# Patient Record
Sex: FEMALE | Race: WHITE | HISPANIC OR LATINO | Employment: OTHER | URBAN - METROPOLITAN AREA
[De-identification: names, ages, dates, MRNs, and addresses within clinical notes are randomized per-mention and may not be internally consistent; named-entity substitution may affect disease eponyms.]

---

## 2018-01-19 ENCOUNTER — HOSPITAL ENCOUNTER (INPATIENT)
Facility: HOSPITAL | Age: 68
LOS: 3 days | Discharge: HOME | DRG: 369 | End: 2018-01-23
Attending: EMERGENCY MEDICINE | Admitting: INTERNAL MEDICINE
Payer: MEDICARE

## 2018-01-19 DIAGNOSIS — R19.5 HEME POSITIVE STOOL: ICD-10-CM

## 2018-01-19 DIAGNOSIS — D61.818 PANCYTOPENIA: Primary | ICD-10-CM

## 2018-01-19 DIAGNOSIS — K92.2 GI BLEED: ICD-10-CM

## 2018-01-19 DIAGNOSIS — E11.9 DM (DIABETES MELLITUS): ICD-10-CM

## 2018-01-19 PROCEDURE — 93010 ELECTROCARDIOGRAM REPORT: CPT | Mod: ,,, | Performed by: INTERNAL MEDICINE

## 2018-01-19 PROCEDURE — 99285 EMERGENCY DEPT VISIT HI MDM: CPT | Mod: 25

## 2018-01-19 PROCEDURE — 93005 ELECTROCARDIOGRAM TRACING: CPT

## 2018-01-20 PROBLEM — E11.9 DM (DIABETES MELLITUS): Status: ACTIVE | Noted: 2018-01-20

## 2018-01-20 PROBLEM — R93.89 CHEST X-RAY ABNORMALITY: Status: ACTIVE | Noted: 2018-01-20

## 2018-01-20 PROBLEM — D61.818 PANCYTOPENIA: Status: ACTIVE | Noted: 2018-01-20

## 2018-01-20 PROBLEM — K92.2 GI BLEED: Status: ACTIVE | Noted: 2018-01-20

## 2018-01-20 PROBLEM — I10 HYPERTENSION: Status: ACTIVE | Noted: 2018-01-20

## 2018-01-20 PROBLEM — R93.89 CHEST X-RAY ABNORMALITY: Status: RESOLVED | Noted: 2018-01-20 | Resolved: 2018-01-20

## 2018-01-20 PROBLEM — D62 ACUTE BLOOD LOSS ANEMIA: Status: ACTIVE | Noted: 2018-01-20

## 2018-01-20 LAB
ABO + RH BLD: NORMAL
ALBUMIN SERPL BCP-MCNC: 3.5 G/DL
ALP SERPL-CCNC: 64 U/L
ALT SERPL W/O P-5'-P-CCNC: 10 U/L
AMPHET+METHAMPHET UR QL: NEGATIVE
ANION GAP SERPL CALC-SCNC: 8 MMOL/L
ANION GAP SERPL CALC-SCNC: 8 MMOL/L
ANION GAP SERPL CALC-SCNC: 9 MMOL/L
ANISOCYTOSIS BLD QL SMEAR: ABNORMAL
APTT BLDCRRT: 21.2 SEC
APTT BLDCRRT: 21.9 SEC
AST SERPL-CCNC: 29 U/L
BARBITURATES UR QL SCN>200 NG/ML: NEGATIVE
BASOPHILS # BLD AUTO: 0.01 K/UL
BASOPHILS NFR BLD: 0.4 %
BASOPHILS NFR BLD: 0.5 %
BASOPHILS NFR BLD: 0.5 %
BENZODIAZ UR QL SCN>200 NG/ML: NEGATIVE
BILIRUB SERPL-MCNC: 2 MG/DL
BILIRUB UR QL STRIP: NEGATIVE
BLD GP AB SCN CELLS X3 SERPL QL: NORMAL
BLD PROD TYP BPU: NORMAL
BLOOD UNIT EXPIRATION DATE: NORMAL
BLOOD UNIT TYPE CODE: 5100
BLOOD UNIT TYPE: NORMAL
BUN SERPL-MCNC: 15 MG/DL
BUN SERPL-MCNC: 15 MG/DL
BUN SERPL-MCNC: 17 MG/DL
BZE UR QL SCN: NEGATIVE
CALCIUM SERPL-MCNC: 8.6 MG/DL
CANNABINOIDS UR QL SCN: NEGATIVE
CHLORIDE SERPL-SCNC: 110 MMOL/L
CLARITY UR: CLEAR
CO2 SERPL-SCNC: 19 MMOL/L
CO2 SERPL-SCNC: 20 MMOL/L
CO2 SERPL-SCNC: 20 MMOL/L
CODING SYSTEM: NORMAL
COLOR UR: YELLOW
CREAT SERPL-MCNC: 0.9 MG/DL
CREAT SERPL-MCNC: 0.9 MG/DL
CREAT SERPL-MCNC: 1 MG/DL
CREAT UR-MCNC: 34 MG/DL
DACRYOCYTES BLD QL SMEAR: ABNORMAL
DAT IGG-SP REAG RBC-IMP: NORMAL
DIFFERENTIAL METHOD: ABNORMAL
DISPENSE STATUS: NORMAL
EOSINOPHIL # BLD AUTO: 0 K/UL
EOSINOPHIL NFR BLD: 0 %
ERYTHROCYTE [DISTWIDTH] IN BLOOD BY AUTOMATED COUNT: 31.9 %
ERYTHROCYTE [DISTWIDTH] IN BLOOD BY AUTOMATED COUNT: 32.7 %
ERYTHROCYTE [DISTWIDTH] IN BLOOD BY AUTOMATED COUNT: 32.7 %
EST. GFR  (AFRICAN AMERICAN): >60 ML/MIN/1.73 M^2
EST. GFR  (NON AFRICAN AMERICAN): 58 ML/MIN/1.73 M^2
EST. GFR  (NON AFRICAN AMERICAN): >60 ML/MIN/1.73 M^2
EST. GFR  (NON AFRICAN AMERICAN): >60 ML/MIN/1.73 M^2
ESTIMATED AVG GLUCOSE: 105 MG/DL
ETHANOL UR-MCNC: <10 MG/DL
FERRITIN SERPL-MCNC: 130 NG/ML
FOLATE SERPL-MCNC: 16 NG/ML
GLUCOSE SERPL-MCNC: 117 MG/DL
GLUCOSE SERPL-MCNC: 117 MG/DL
GLUCOSE SERPL-MCNC: 126 MG/DL
GLUCOSE UR QL STRIP: NEGATIVE
HAPTOGLOB SERPL-MCNC: 30 MG/DL
HBA1C MFR BLD HPLC: 5.3 %
HCT VFR BLD AUTO: 19.9 %
HCT VFR BLD AUTO: 20.8 %
HCT VFR BLD AUTO: 22 %
HCT VFR BLD AUTO: 25.3 %
HGB BLD-MCNC: 6.9 G/DL
HGB BLD-MCNC: 7.1 G/DL
HGB BLD-MCNC: 7.6 G/DL
HGB BLD-MCNC: 8.6 G/DL
HGB UR QL STRIP: NEGATIVE
HIV1+2 IGG SERPL QL IA.RAPID: NEGATIVE
INR PPP: 1
INR PPP: 1
IRON SERPL-MCNC: 80 UG/DL
KETONES UR QL STRIP: NEGATIVE
LDH SERPL L TO P-CCNC: 707 U/L
LEUKOCYTE ESTERASE UR QL STRIP: NEGATIVE
LYMPHOCYTES # BLD AUTO: 0.9 K/UL
LYMPHOCYTES # BLD AUTO: 1 K/UL
LYMPHOCYTES # BLD AUTO: 1 K/UL
LYMPHOCYTES NFR BLD: 40.1 %
LYMPHOCYTES NFR BLD: 45.7 %
LYMPHOCYTES NFR BLD: 45.7 %
MCH RBC QN AUTO: 33.6 PG
MCH RBC QN AUTO: 33.6 PG
MCH RBC QN AUTO: 34.1 PG
MCHC RBC AUTO-ENTMCNC: 34.1 G/DL
MCHC RBC AUTO-ENTMCNC: 34.1 G/DL
MCHC RBC AUTO-ENTMCNC: 34.5 G/DL
MCV RBC AUTO: 99 FL
METHADONE UR QL SCN>300 NG/ML: NEGATIVE
MONOCYTES # BLD AUTO: 0.1 K/UL
MONOCYTES # BLD AUTO: 0.1 K/UL
MONOCYTES # BLD AUTO: 0.2 K/UL
MONOCYTES NFR BLD: 6.3 %
MONOCYTES NFR BLD: 6.3 %
MONOCYTES NFR BLD: 7.8 %
NEUTROPHILS # BLD AUTO: 1.1 K/UL
NEUTROPHILS # BLD AUTO: 1.1 K/UL
NEUTROPHILS # BLD AUTO: 1.2 K/UL
NEUTROPHILS NFR BLD: 52 %
NEUTROPHILS NFR BLD: 52 %
NEUTROPHILS NFR BLD: 56 %
NITRITE UR QL STRIP: NEGATIVE
NUM UNITS TRANS PACKED RBC: NORMAL
OPIATES UR QL SCN: NEGATIVE
OVALOCYTES BLD QL SMEAR: ABNORMAL
PCP UR QL SCN>25 NG/ML: NEGATIVE
PH UR STRIP: 5 [PH] (ref 5–8)
PLATELET # BLD AUTO: 114 K/UL
PLATELET # BLD AUTO: 116 K/UL
PLATELET # BLD AUTO: 116 K/UL
PLATELET BLD QL SMEAR: ABNORMAL
PMV BLD AUTO: 9.5 FL
PMV BLD AUTO: 9.7 FL
PMV BLD AUTO: 9.7 FL
POCT GLUCOSE: 120 MG/DL (ref 70–110)
POCT GLUCOSE: 130 MG/DL (ref 70–110)
POCT GLUCOSE: 133 MG/DL (ref 70–110)
POCT GLUCOSE: 135 MG/DL (ref 70–110)
POIKILOCYTOSIS BLD QL SMEAR: SLIGHT
POLYCHROMASIA BLD QL SMEAR: ABNORMAL
POTASSIUM SERPL-SCNC: 4.3 MMOL/L
POTASSIUM SERPL-SCNC: 4.3 MMOL/L
POTASSIUM SERPL-SCNC: 4.4 MMOL/L
PROT SERPL-MCNC: 7.3 G/DL
PROT UR QL STRIP: NEGATIVE
PROTHROMBIN TIME: 10.7 SEC
PROTHROMBIN TIME: 10.7 SEC
RBC # BLD AUTO: 2.11 M/UL
RBC # BLD AUTO: 2.11 M/UL
RBC # BLD AUTO: 2.23 M/UL
RETICS/RBC NFR AUTO: 5.5 %
SODIUM SERPL-SCNC: 138 MMOL/L
SP GR UR STRIP: 1.01 (ref 1–1.03)
SPHEROCYTES BLD QL SMEAR: ABNORMAL
TOXICOLOGY INFORMATION: NORMAL
URN SPEC COLLECT METH UR: NORMAL
UROBILINOGEN UR STRIP-ACNC: NEGATIVE EU/DL
VIT B12 SERPL-MCNC: <146 PG/ML
WBC # BLD AUTO: 2.21 K/UL
WBC # BLD AUTO: 2.21 K/UL
WBC # BLD AUTO: 2.32 K/UL

## 2018-01-20 PROCEDURE — 82746 ASSAY OF FOLIC ACID SERUM: CPT

## 2018-01-20 PROCEDURE — 21400001 HC TELEMETRY ROOM

## 2018-01-20 PROCEDURE — 85025 COMPLETE CBC W/AUTO DIFF WBC: CPT

## 2018-01-20 PROCEDURE — 86880 COOMBS TEST DIRECT: CPT

## 2018-01-20 PROCEDURE — 99223 1ST HOSP IP/OBS HIGH 75: CPT | Mod: ,,, | Performed by: INTERNAL MEDICINE

## 2018-01-20 PROCEDURE — 83615 LACTATE (LD) (LDH) ENZYME: CPT

## 2018-01-20 PROCEDURE — 85730 THROMBOPLASTIN TIME PARTIAL: CPT | Mod: 91

## 2018-01-20 PROCEDURE — 86920 COMPATIBILITY TEST SPIN: CPT

## 2018-01-20 PROCEDURE — 85610 PROTHROMBIN TIME: CPT | Mod: 91

## 2018-01-20 PROCEDURE — 94799 UNLISTED PULMONARY SVC/PX: CPT

## 2018-01-20 PROCEDURE — 85730 THROMBOPLASTIN TIME PARTIAL: CPT

## 2018-01-20 PROCEDURE — 25000003 PHARM REV CODE 250: Performed by: INTERNAL MEDICINE

## 2018-01-20 PROCEDURE — 83036 HEMOGLOBIN GLYCOSYLATED A1C: CPT

## 2018-01-20 PROCEDURE — 87086 URINE CULTURE/COLONY COUNT: CPT

## 2018-01-20 PROCEDURE — 94640 AIRWAY INHALATION TREATMENT: CPT

## 2018-01-20 PROCEDURE — 25000003 PHARM REV CODE 250: Performed by: NURSE PRACTITIONER

## 2018-01-20 PROCEDURE — C9113 INJ PANTOPRAZOLE SODIUM, VIA: HCPCS | Performed by: EMERGENCY MEDICINE

## 2018-01-20 PROCEDURE — 81003 URINALYSIS AUTO W/O SCOPE: CPT

## 2018-01-20 PROCEDURE — 85014 HEMATOCRIT: CPT | Mod: 91

## 2018-01-20 PROCEDURE — 80053 COMPREHEN METABOLIC PANEL: CPT

## 2018-01-20 PROCEDURE — 80048 BASIC METABOLIC PNL TOTAL CA: CPT

## 2018-01-20 PROCEDURE — 85610 PROTHROMBIN TIME: CPT

## 2018-01-20 PROCEDURE — 25000242 PHARM REV CODE 250 ALT 637 W/ HCPCS: Performed by: NURSE PRACTITIONER

## 2018-01-20 PROCEDURE — 36430 TRANSFUSION BLD/BLD COMPNT: CPT

## 2018-01-20 PROCEDURE — 80074 ACUTE HEPATITIS PANEL: CPT

## 2018-01-20 PROCEDURE — P9016 RBC LEUKOCYTES REDUCED: HCPCS

## 2018-01-20 PROCEDURE — 86703 HIV-1/HIV-2 1 RESULT ANTBDY: CPT

## 2018-01-20 PROCEDURE — 85018 HEMOGLOBIN: CPT

## 2018-01-20 PROCEDURE — 80307 DRUG TEST PRSMV CHEM ANLYZR: CPT

## 2018-01-20 PROCEDURE — 63600175 PHARM REV CODE 636 W HCPCS: Performed by: NURSE PRACTITIONER

## 2018-01-20 PROCEDURE — 86850 RBC ANTIBODY SCREEN: CPT

## 2018-01-20 PROCEDURE — 25000003 PHARM REV CODE 250: Performed by: EMERGENCY MEDICINE

## 2018-01-20 PROCEDURE — 83010 ASSAY OF HAPTOGLOBIN QUANT: CPT

## 2018-01-20 PROCEDURE — 82728 ASSAY OF FERRITIN: CPT

## 2018-01-20 PROCEDURE — 80321 ALCOHOLS BIOMARKERS 1OR 2: CPT

## 2018-01-20 PROCEDURE — 85045 AUTOMATED RETICULOCYTE COUNT: CPT

## 2018-01-20 PROCEDURE — 87040 BLOOD CULTURE FOR BACTERIA: CPT

## 2018-01-20 PROCEDURE — 83540 ASSAY OF IRON: CPT

## 2018-01-20 PROCEDURE — 94761 N-INVAS EAR/PLS OXIMETRY MLT: CPT

## 2018-01-20 PROCEDURE — 63600175 PHARM REV CODE 636 W HCPCS: Performed by: EMERGENCY MEDICINE

## 2018-01-20 PROCEDURE — 82607 VITAMIN B-12: CPT

## 2018-01-20 PROCEDURE — 36415 COLL VENOUS BLD VENIPUNCTURE: CPT

## 2018-01-20 RX ORDER — PHENAZOPYRIDINE HYDROCHLORIDE 100 MG/1
100 TABLET, FILM COATED ORAL 3 TIMES DAILY PRN
COMMUNITY

## 2018-01-20 RX ORDER — PANTOPRAZOLE SODIUM 40 MG/10ML
40 INJECTION, POWDER, LYOPHILIZED, FOR SOLUTION INTRAVENOUS 2 TIMES DAILY
Status: DISCONTINUED | OUTPATIENT
Start: 2018-01-20 | End: 2018-01-23 | Stop reason: HOSPADM

## 2018-01-20 RX ORDER — GLIMEPIRIDE 2 MG/1
2 TABLET ORAL
COMMUNITY

## 2018-01-20 RX ORDER — HYDROCODONE BITARTRATE AND ACETAMINOPHEN 500; 5 MG/1; MG/1
TABLET ORAL
Status: DISCONTINUED | OUTPATIENT
Start: 2018-01-20 | End: 2018-01-23 | Stop reason: HOSPADM

## 2018-01-20 RX ORDER — METFORMIN HYDROCHLORIDE 1000 MG/1
1000 TABLET ORAL 2 TIMES DAILY WITH MEALS
COMMUNITY

## 2018-01-20 RX ORDER — GUAIFENESIN 600 MG/1
600 TABLET, EXTENDED RELEASE ORAL 2 TIMES DAILY
Status: DISCONTINUED | OUTPATIENT
Start: 2018-01-20 | End: 2018-01-23 | Stop reason: HOSPADM

## 2018-01-20 RX ORDER — LISINOPRIL 10 MG/1
10 TABLET ORAL DAILY
Status: DISCONTINUED | OUTPATIENT
Start: 2018-01-20 | End: 2018-01-23 | Stop reason: HOSPADM

## 2018-01-20 RX ORDER — IPRATROPIUM BROMIDE AND ALBUTEROL SULFATE 2.5; .5 MG/3ML; MG/3ML
3 SOLUTION RESPIRATORY (INHALATION) EVERY 8 HOURS
Status: DISCONTINUED | OUTPATIENT
Start: 2018-01-20 | End: 2018-01-23 | Stop reason: HOSPADM

## 2018-01-20 RX ORDER — GLUCAGON 1 MG
1 KIT INJECTION
Status: DISCONTINUED | OUTPATIENT
Start: 2018-01-20 | End: 2018-01-23 | Stop reason: HOSPADM

## 2018-01-20 RX ORDER — LISINOPRIL 10 MG/1
10 TABLET ORAL DAILY
COMMUNITY

## 2018-01-20 RX ORDER — ACETAMINOPHEN 325 MG/1
650 TABLET ORAL EVERY 6 HOURS PRN
Status: DISCONTINUED | OUTPATIENT
Start: 2018-01-20 | End: 2018-01-23 | Stop reason: HOSPADM

## 2018-01-20 RX ORDER — SODIUM CHLORIDE 9 MG/ML
INJECTION, SOLUTION INTRAVENOUS CONTINUOUS
Status: DISCONTINUED | OUTPATIENT
Start: 2018-01-20 | End: 2018-01-23 | Stop reason: HOSPADM

## 2018-01-20 RX ORDER — INSULIN ASPART 100 [IU]/ML
0-5 INJECTION, SOLUTION INTRAVENOUS; SUBCUTANEOUS EVERY 6 HOURS PRN
Status: DISCONTINUED | OUTPATIENT
Start: 2018-01-20 | End: 2018-01-23 | Stop reason: HOSPADM

## 2018-01-20 RX ORDER — IBUPROFEN 200 MG
24 TABLET ORAL
Status: DISCONTINUED | OUTPATIENT
Start: 2018-01-20 | End: 2018-01-23 | Stop reason: HOSPADM

## 2018-01-20 RX ORDER — HYDRALAZINE HYDROCHLORIDE 20 MG/ML
5 INJECTION INTRAMUSCULAR; INTRAVENOUS EVERY 6 HOURS PRN
Status: DISCONTINUED | OUTPATIENT
Start: 2018-01-20 | End: 2018-01-23 | Stop reason: HOSPADM

## 2018-01-20 RX ORDER — CIPROFLOXACIN 500 MG/1
500 TABLET ORAL 2 TIMES DAILY
Status: ON HOLD | COMMUNITY
End: 2018-01-23 | Stop reason: HOSPADM

## 2018-01-20 RX ORDER — IBUPROFEN 200 MG
16 TABLET ORAL
Status: DISCONTINUED | OUTPATIENT
Start: 2018-01-20 | End: 2018-01-23 | Stop reason: HOSPADM

## 2018-01-20 RX ADMIN — AZITHROMYCIN MONOHYDRATE 500 MG: 500 INJECTION, POWDER, LYOPHILIZED, FOR SOLUTION INTRAVENOUS at 06:01

## 2018-01-20 RX ADMIN — SODIUM CHLORIDE: 0.9 INJECTION, SOLUTION INTRAVENOUS at 02:01

## 2018-01-20 RX ADMIN — PANTOPRAZOLE SODIUM 40 MG: 40 INJECTION, POWDER, FOR SOLUTION INTRAVENOUS at 09:01

## 2018-01-20 RX ADMIN — ACETAMINOPHEN 650 MG: 325 TABLET ORAL at 03:01

## 2018-01-20 RX ADMIN — IPRATROPIUM BROMIDE AND ALBUTEROL SULFATE 3 ML: .5; 3 SOLUTION RESPIRATORY (INHALATION) at 04:01

## 2018-01-20 RX ADMIN — GUAIFENESIN 600 MG: 600 TABLET, EXTENDED RELEASE ORAL at 09:01

## 2018-01-20 RX ADMIN — CEFTRIAXONE 1 G: 1 INJECTION, SOLUTION INTRAVENOUS at 05:01

## 2018-01-20 RX ADMIN — LISINOPRIL 10 MG: 10 TABLET ORAL at 09:01

## 2018-01-20 RX ADMIN — IPRATROPIUM BROMIDE AND ALBUTEROL SULFATE 3 ML: .5; 3 SOLUTION RESPIRATORY (INHALATION) at 03:01

## 2018-01-20 NOTE — HPI
The patient is a 67 year old  female who is  with 4 children. 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 This is my 1 st encounter with the patient. Her english is poor, so Alexander  is at the bedside.                                                               Chief complaint: Weakness and fatigue                                                             Complaint of consultation: Anemia; Heme positive stool    The patient was transferred from an outside facility where she presented with complaint of worsening weakness and fatigue over the past 2 weeks. On assessment there she was found to be pancytopenic and stools tested positive for blood. She was suspected by this of having an acute blood lost anemia provoking her transfer. She has never has a GI evaluation before.    She denies abdominal pain, nausea or vomiting, anorexia or weight loss, BRBPR or melena, change in bowel habits, constipation or diarrhea, and no heartburn. She knows of no FH of GI disorders and has never had similar history herself. She admits to occasional use of Aleve which she says she may have used twice in the past 2 weeks. She does not  Use ASAs because they make her nauseous.    On presentation here her Hgb was 7.6 g. She received 2 units of PRBCs before her transfer here. Her BUN and creat are normal. There was an elevated LDH, and her LFTs are only remarkable for an elevated bilirubin. Assessment revealed the presence of vascular congestion on Chest x ray with mild cardiomegaly.

## 2018-01-20 NOTE — HOSPITAL COURSE
On 1/19 patient was admitted to [facility] for Acute Blood Loss Anemia, Pancytopenia, and presumed GI Bleed with Occult + stool.  Upon admit here her Hgb was 7.6 after receiving 2 units of PRBCs prior to transferring here.  Hemoc and GI were consulted.  Patient is primarily Korean speaking requiring an interpretor.  Per Hemoc, her markedly elevated LDH (707) suggests either primary liver disease or hemolysis.  Reticulocyte count 5.5, haptoglobin 30 and direct Josy negative.  Pt/INR and PTT WNL.  UDS negative.  Per GI she would benefit from Endoscopy given she has never had a routine Colonoscopy, but doubtful that Anemia is r/t GI source.  CXR on admit showed mild cardiomegaly with mild pulmonary edema.  BC and UC show NGTD.  She has remained afebrile and has no evidence of PNA, therefore Azithromycin and Rocephin dc'd.  She was recently treated for a UTI.  Repeat UA here and UC negative for infectious process. US of spleen on 1/21 showed a homogeneous non-enlarged spleen at 11.8 cm.  Pathologist consulted per Hemoc for bone marrow biopsy.  Son at  today reported his biological son was diagnosed with Dyskeratosis Congenita at the age of 6 and required a bone marrow transplant at [facility].  Son denies known h/o other family members with this diagnosis. His wife has 2 children from a prior marriage that do not have this disorder.  Hgb improved to 8.6 s/p 1 unit PRBCs on 1/20 and is now 7.8.  Platelets stable at 114. B12 <146, started on daily supplementation with 1000 mcg sq on 1/21.  Patient will continue daily treatment to complete 7 days, then weekly x 1 month, then monthly replacement.  Acute Hepatitis panel and HIV negative.  She is s/p EGD and Colonoscopy per Dr. Owens today and bone marrow biopsy.  Colonoscopy noted to be normal.  EGD showed Mildly severe candidiasis esophagitis, Gastric mucosal atrophy and a single mucosal papule (nodule) found in the stomach, all of which were biopsied. She will need to f/u  with Dr. Owens in 2 weeks for biopsy results.  Case d/w Dr. Abdi and Dr. Owens.  Ok to DC home from Hemoc and GI standpoint tonight with outpatient f/u.  Patient will need to f/u with Dr. Abdi in 1 week for Bone Marrow biopsy results.  She will need to f/u with Dr. Owens in 2 weeks for EGD biopsy results.  Case also d/w Dr. Taylor.  Patient seen and examined post procedures and deemed medically stable to DC home today if she tolerates a FL diet without difficulty.   DSG to BM biopsy site CDI.  Medications reconciled for DC home.  Daughter educated on how to administer B12 sq.  Patient and family verbalized + understanding of DC instructions.

## 2018-01-20 NOTE — HPI
"Viv Argueta is a 67 y.o. female patient is transfer from [facility] for GI services. The patient reports she presented to the ER with complaints of "feeling bad, weak." The patient reports onset "a few days ago." Reports recently around her family member who diagnosed with the Flu ( and son). Documentation sent with patient notes pt was seen in clinic 1/19/2017 prior to ER at [facility]-->Flu neg, H/H 4.5/13.5 WBC 2.2. Was diagnosed with acute bronchitis, anemia and leukoctopenia was sent to [facility] ER. Further eval in [facility] noted +Occult Stool, was tranfused 2 units PRBC, given and 40 IV Protonix. No exacerbating factors reported. Associated sxs include non productive cough. Patient was started on course of Cipro and Pyridim 1/11/18 for UTI. Patient denies fever, chills, congestion, sob, cp, abd pain,  or GI symptoms, and all other sxs at this time. No further complaints or concerns at this time. The patient admitted for pancytopenia, acute blood loss anemia from suspected GI bleed. Labs collected on arrival to [facility] consistent with pancytopenia, AST/ALT normal, LD elevated. Rapid HIV neg. Will further eval with anemia labs, Acute Hep panel given LD elevation and pancytopenia. Also Check Urine as well as ETOH and Drugs-->notes occasional drinker. Check Chest Xray-->independent soft read vascular congestion and ?left low consilidation. Will cover rocephin and azithromycin and check blood cultures  "

## 2018-01-20 NOTE — PROGRESS NOTES
Critical lab called: Hgb 6.9 and Hct 19.9.  Joseline NP notified and stated she will talk hemonc and order to transfuse blood. Pt primary nurse Kenia notified.

## 2018-01-20 NOTE — CONSULTS
[facility] -   Gastroenterology  Consult Note    Patient Name: Viv Argueta  MRN: [id number]  Admission Date: 1/19/2018  Hospital Length of Stay: 0 days  Code Status: Full Code   Attending Provider: Kwaku Taylor MD   Consulting Provider: Aydin Owens Iii, MD  Primary Care Physician: Primary Doctor No  Principal Problem:GI bleed    Consults  Subjective:     HPI:  The patient is a 67 year old  female who is  with 4 children. 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 This is my 1 st encounter with the patient. Her english is poor, so Alexander  is at the bedside.                                                               Chief complaint: Weakness and fatigue                                                             Complaint of consultation: Anemia; Heme positive stool    The patient was transferred from an outside facility where she presented with complaint of worsening weakness and fatigue over the past 2 weeks. On assessment there she was found to be pancytopenic and stools tested positive for blood. She was suspected by this of having an acute blood lost anemia provoking her transfer. She has never has a GI evaluation before.    She denies abdominal pain, nausea or vomiting, anorexia or weight loss, BRBPR or melena, change in bowel habits, constipation or diarrhea, and no heartburn. She knows of no FH of GI disorders and has never had similar history herself. She admits to occasional use of Aleve which she says she may have used twice in the past 2 weeks. She does not  Use ASAs because they make her nauseous.    On presentation here her Hgb was 7.6 g. She received 2 units of PRBCs before her transfer here. Her BUN and creat are normal. There was an elevated LDH, and her LFTs are only remarkable for an elevated bilirubin. Assessment revealed the presence of vascular congestion on Chest x ray with mild cardiomegaly.      Past Medical  History:   Diagnosis Date    Diabetes mellitus     Hypertension        Past Surgical History:   Procedure Laterality Date     SECTION         Review of patient's allergies indicates:  No Known Allergies  Family History     Family history is unknown by patient.        Social History Main Topics    Smoking status: Never Smoker    Smokeless tobacco: Never Used    Alcohol use Yes      Comment: occasional    Drug use: No    Sexual activity: No     Review of Systems   Constitutional: Positive for fatigue. Negative for activity change, appetite change, chills, diaphoresis, fever and unexpected weight change.   HENT: Negative for congestion, ear discharge, ear pain, hearing loss, nosebleeds, postnasal drip and tinnitus.    Eyes: Negative for photophobia and visual disturbance.   Respiratory: Positive for cough. Negative for apnea, choking, chest tightness, shortness of breath and wheezing.    Cardiovascular: Negative for chest pain, palpitations and leg swelling.   Gastrointestinal: Negative for abdominal distention, abdominal pain, anal bleeding, blood in stool, constipation, diarrhea, nausea, rectal pain and vomiting.   Genitourinary: Positive for dysuria. Negative for difficulty urinating, dyspareunia, flank pain, frequency, hematuria, menstrual problem, pelvic pain, urgency, vaginal bleeding and vaginal discharge.   Musculoskeletal: Negative for arthralgias, back pain, gait problem, joint swelling, myalgias and neck stiffness.   Skin: Negative for pallor and rash.   Neurological: Positive for weakness. Negative for dizziness, tremors, seizures, syncope, speech difficulty, numbness and headaches.   Hematological: Negative for adenopathy.   Psychiatric/Behavioral: Negative for agitation, confusion, hallucinations, sleep disturbance and suicidal ideas.     Objective:     Vital Signs (Most Recent):  Temp: 98.4 °F (36.9 °C) (18)  Pulse: 91 (18)  Resp: 18 (18)  BP: (!) 141/63  (01/20/18 0722)  SpO2: 96 % (01/20/18 0722) Vital Signs (24h Range):  Temp:  [97.9 °F (36.6 °C)-98.9 °F (37.2 °C)] 98.4 °F (36.9 °C)  Pulse:  [] 91  Resp:  [18-20] 18  SpO2:  [93 %-100 %] 96 %  BP: (141-180)/(63-84) 141/63     Weight: 69.9 kg (154 lb 1.6 oz) (01/20/18 0230)  Body mass index is 26.45 kg/m².      Intake/Output Summary (Last 24 hours) at 01/20/18 1143  Last data filed at 01/20/18 0748   Gross per 24 hour   Intake            217.5 ml   Output              500 ml   Net           -282.5 ml       Lines/Drains/Airways     Peripheral Intravenous Line                 Peripheral IV - Single Lumen 01/20/18 0045 Left Hand less than 1 day         Peripheral IV - Single Lumen 01/20/18 Right Hand less than 1 day                Physical Exam   Constitutional: She is oriented to person, place, and time. She appears well-developed and well-nourished.   HENT:   Head: Normocephalic and atraumatic.   Bilateral turbinate congestion   Eyes: EOM are normal. Pupils are equal, round, and reactive to light. Right eye exhibits no discharge. Left eye exhibits no discharge. No scleral icterus.   Conjunctivae pale   Neck: Normal range of motion. Neck supple. No JVD present. No thyromegaly present.   Cardiovascular: Regular rhythm, normal heart sounds and intact distal pulses.  Exam reveals no gallop and no friction rub.    No murmur heard.  Mild Tachycardia   Pulmonary/Chest: Effort normal. No respiratory distress. She has wheezes. She has no rales. She exhibits no tenderness.   Abdominal: Soft. Bowel sounds are normal. She exhibits no distension and no mass. There is no tenderness. There is no rebound and no guarding.   Musculoskeletal: Normal range of motion. She exhibits no edema.   Lymphadenopathy:     She has no cervical adenopathy.   Neurological: She is alert and oriented to person, place, and time. She has normal reflexes. She exhibits normal muscle tone. Coordination normal.   Skin: Skin is warm and dry. No rash  noted. No erythema. No pallor.   Psychiatric: She has a normal mood and affect. Her behavior is normal. Judgment and thought content normal.   Vitals reviewed.      Significant Labs:  All pertinent lab results from the last 24 hours have been reviewed.    Significant Imaging:  Imaging results within the past 24 hours have been reviewed.    Assessment/Plan:     * GI bleed    See anemia above.        DM (diabetes mellitus)    As/per attending service.        Chest x-ray abnormality    As/per attending service        Hypertension    As/per attending service. GH        Acute blood loss anemia    Heme positive stool, but this is doubtful to be acute blood loss anemia. Hematology evaluating. The patient has no gross evidence of GI source of this magnitude but since she has never been evaluated before would benefit from assessment especially since stool positive. Agree with transfusion. Await results of hematology workup. Endoscopy when stable respiratory wise and resuscitated.        Pancytopenia    Reviewed note from Hematology. Agree with their recommendation. Increased LDH and bilirubin. Being worked up for hemolysis.            Thank you for your consult. I will follow-up with patient. Please contact us if you have any additional questions.    Aydin Owens Iii, MD  Gastroenterology  [facility]

## 2018-01-20 NOTE — ASSESSMENT & PLAN NOTE
- [facility] +  - Evaluated by GI today  - Per GI, would benefit from Endoscopy since she has never had any prior screenings  - CL diet after MN  - Continue PPI  - Hgb/Hct q 6 hours  - Transfuse as needed to keep Hgb >7.0  - Monitor

## 2018-01-20 NOTE — HPI
67-year-old female transferred from [facility] through [facility].  For GI evaluation.  The patient was found to be pancytopenic I was asked see the patient for further evaluation.  Patient's history although limited by her ability to communicate with me stating that she was doing recently well until the last several days when she's been having increasing fatigue and weakness

## 2018-01-20 NOTE — ASSESSMENT & PLAN NOTE
The patient appears to be pancytopenic review of her peripheral smear reveals no marked evidence of abnormal peripheral blood count.  Markedly elevated LDH suggests either primary liver disease or hemolysis will check reticulocyte count and haptoglobin and direct Josy.  Continue to follow

## 2018-01-20 NOTE — SUBJECTIVE & OBJECTIVE
Past Medical History:   Diagnosis Date    Diabetes mellitus     Hypertension        Past Surgical History:   Procedure Laterality Date     SECTION         Review of patient's allergies indicates:  No Known Allergies  Family History     Family history is unknown by patient.        Social History Main Topics    Smoking status: Never Smoker    Smokeless tobacco: Never Used    Alcohol use Yes      Comment: occasional    Drug use: No    Sexual activity: No     Review of Systems   Constitutional: Positive for fatigue. Negative for activity change, appetite change, chills, diaphoresis, fever and unexpected weight change.   HENT: Negative for congestion, ear discharge, ear pain, hearing loss, nosebleeds, postnasal drip and tinnitus.    Eyes: Negative for photophobia and visual disturbance.   Respiratory: Positive for cough. Negative for apnea, choking, chest tightness, shortness of breath and wheezing.    Cardiovascular: Negative for chest pain, palpitations and leg swelling.   Gastrointestinal: Negative for abdominal distention, abdominal pain, anal bleeding, blood in stool, constipation, diarrhea, nausea, rectal pain and vomiting.   Genitourinary: Positive for dysuria. Negative for difficulty urinating, dyspareunia, flank pain, frequency, hematuria, menstrual problem, pelvic pain, urgency, vaginal bleeding and vaginal discharge.   Musculoskeletal: Negative for arthralgias, back pain, gait problem, joint swelling, myalgias and neck stiffness.   Skin: Negative for pallor and rash.   Neurological: Positive for weakness. Negative for dizziness, tremors, seizures, syncope, speech difficulty, numbness and headaches.   Hematological: Negative for adenopathy.   Psychiatric/Behavioral: Negative for agitation, confusion, hallucinations, sleep disturbance and suicidal ideas.     Objective:     Vital Signs (Most Recent):  Temp: 98.4 °F (36.9 °C) (18)  Pulse: 91 (18)  Resp: 18 (18)  BP:  (!) 141/63 (01/20/18 0722)  SpO2: 96 % (01/20/18 0722) Vital Signs (24h Range):  Temp:  [97.9 °F (36.6 °C)-98.9 °F (37.2 °C)] 98.4 °F (36.9 °C)  Pulse:  [] 91  Resp:  [18-20] 18  SpO2:  [93 %-100 %] 96 %  BP: (141-180)/(63-84) 141/63     Weight: 69.9 kg (154 lb 1.6 oz) (01/20/18 0230)  Body mass index is 26.45 kg/m².      Intake/Output Summary (Last 24 hours) at 01/20/18 1143  Last data filed at 01/20/18 0748   Gross per 24 hour   Intake            217.5 ml   Output              500 ml   Net           -282.5 ml       Lines/Drains/Airways     Peripheral Intravenous Line                 Peripheral IV - Single Lumen 01/20/18 0045 Left Hand less than 1 day         Peripheral IV - Single Lumen 01/20/18 Right Hand less than 1 day                Physical Exam   Constitutional: She is oriented to person, place, and time. She appears well-developed and well-nourished.   HENT:   Head: Normocephalic and atraumatic.   Bilateral turbinate congestion   Eyes: EOM are normal. Pupils are equal, round, and reactive to light. Right eye exhibits no discharge. Left eye exhibits no discharge. No scleral icterus.   Conjunctivae pale   Neck: Normal range of motion. Neck supple. No JVD present. No thyromegaly present.   Cardiovascular: Regular rhythm, normal heart sounds and intact distal pulses.  Exam reveals no gallop and no friction rub.    No murmur heard.  Mild Tachycardia   Pulmonary/Chest: Effort normal. No respiratory distress. She has wheezes. She has no rales. She exhibits no tenderness.   Abdominal: Soft. Bowel sounds are normal. She exhibits no distension and no mass. There is no tenderness. There is no rebound and no guarding.   Musculoskeletal: Normal range of motion. She exhibits no edema.   Lymphadenopathy:     She has no cervical adenopathy.   Neurological: She is alert and oriented to person, place, and time. She has normal reflexes. She exhibits normal muscle tone. Coordination normal.   Skin: Skin is warm and dry.  No rash noted. No erythema. No pallor.   Psychiatric: She has a normal mood and affect. Her behavior is normal. Judgment and thought content normal.   Vitals reviewed.      Significant Labs:  All pertinent lab results from the last 24 hours have been reviewed.    Significant Imaging:  Imaging results within the past 24 hours have been reviewed.

## 2018-01-20 NOTE — ED PROVIDER NOTES
SCRIBE #1 NOTE: I, Lulu Saucedo, am scribing for, and in the presence of, Stephy Hassan DO. I have scribed the entire note.      History      Chief Complaint   Patient presents with    GI Bleeding     transfer from [facility]       Review of patient's allergies indicates:  Allergies not on file     HPI   HPI    1/19/2018, 11:08 PM   History obtained from the patient (using translation line)      History of Present Illness: Viv Argueta is a 67 y.o. female patient who presents to the Emergency Department as a transfer from [facility] for GI services. Pt reports that she went to the hospital because she had been feeling tired and had not felt completely well after her PCP prescribed her medications on Friday. Pt's heme occult was positive for blood at [facility] and according to the note, patient's stool was black. She was treated with Protonix 40 mg IV and 2 units of blood was transfused. Her WBC (2.1), RBC (4.5), and platelet count (146) were low. No exacerbating factors reported. Patient denies fever, chills, CP, SOB, dysuria, hematuria, taking blood thinners, taking NSAIDS, hx of peptic ulcer disease, hx of blood disorders. No further complaints or concerns at this time.       Arrival mode: Ambulance    PCP: Primary Doctor No     Past Medical History:  Past medical history reviewed not relevant      Past Surgical History:  Past surgical history reviewed not relevant      Family History:  Family history reviewed not relevant      Social History:  Social History    Social History Main Topics    Social History Main Topics    Smoking status: Unknown if ever smoked    Smokeless tobacco: Unknown if ever used    Alcohol Use: Unknown drinking history    Drug Use: Unknown if ever used    Sexual Activity: Unknown     ROS   Review of Systems   Constitutional: Positive for fatigue. Negative for chills and fever.   HENT: Negative for sore throat.    Respiratory:  "Negative for shortness of breath.    Cardiovascular: Negative for chest pain.   Gastrointestinal: Positive for anal bleeding. Negative for abdominal pain, diarrhea, nausea and vomiting.   Genitourinary: Negative for dysuria and hematuria.   Musculoskeletal: Negative for back pain.   Skin: Negative for rash.   Neurological: Negative for dizziness, weakness and headaches.   Hematological: Does not bruise/bleed easily.   All other systems reviewed and are negative.      Physical Exam      Initial Vitals [01/19/18 2301]   BP Pulse Resp Temp SpO2   (!) 150/68 95 20 98.5 °F (36.9 °C) 100 %      MAP       95.33          Physical Exam  Nursing Notes and Vital Signs Reviewed.  Constitutional: Patient is in no acute distress. Awake and alert. Well-developed and well-nourished.  Head: Atraumatic. Normocephalic.  Eyes: PERRL. EOM intact. Conjunctivae are pale. No scleral icterus.  ENT: No oral lesions. Mucous membranes are moist. Oropharynx is clear and symmetric. No petechiae.  Neck: Supple. Full ROM. No lymphadenopathy.  Cardiovascular: Regular rate. Regular rhythm. No murmurs, rubs, or gallops. Distal pulses are 2+ and symmetric.  Pulmonary/Chest: No respiratory distress. Clear to auscultation bilaterally. No wheezing, rales, or rhonchi.  Abdominal: Soft and non-distended.  There is no tenderness.  No rebound, guarding, or rigidity.  Good bowel sounds.    Musculoskeletal: Moves all extremities. No obvious deformities. No edema. No calf tenderness.  Skin: Warm and dry.  Neurological:  Alert, awake, and appropriate.  Normal speech.  No acute focal neurological deficits are appreciated.  Psychiatric: Normal affect. Good eye contact. Appropriate in content.    ED Course    Procedures  ED Vital Signs:  Vitals:    01/19/18 2301   BP: (!) 150/68   Pulse: 95   Resp: 20   Temp: 98.5 °F (36.9 °C)   TempSrc: Oral   SpO2: 100%   Weight: 71.7 kg (158 lb)   Height: 5' 4" (1.626 m)       Abnormal Lab Results:  Labs Reviewed   CBC W/ AUTO " DIFFERENTIAL - Abnormal; Notable for the following:        Result Value    WBC 2.32 (*)     RBC 2.23 (*)     Hemoglobin 7.6 (*)     Hematocrit 22.0 (*)     MCV 99 (*)     MCH 34.1 (*)     RDW 31.9 (*)     Platelets 114 (*)     Gran # 1.2 (*)     Lymph # 0.9 (*)     Mono # 0.2 (*)     Platelet Estimate Decreased (*)     All other components within normal limits   COMPREHENSIVE METABOLIC PANEL - Abnormal; Notable for the following:     CO2 19 (*)     Glucose 126 (*)     Calcium 8.6 (*)     Total Bilirubin 2.0 (*)     eGFR if non  58 (*)     All other components within normal limits   LACTATE DEHYDROGENASE - Abnormal; Notable for the following:      (*)     All other components within normal limits   CULTURE, URINE   APTT   PROTIME-INR   RAPID HIV   FERRITIN   IRON AND TIBC   FOLATE   VITAMIN B12   CBC W/ AUTO DIFFERENTIAL   BASIC METABOLIC PANEL   HEMOGLOBIN   HEMATOCRIT   FERRITIN   IRON   BASIC METABOLIC PANEL   MAGNESIUM   PHOSPHORUS   HEMOGLOBIN A1C   CBC W/ AUTO DIFFERENTIAL   APTT   PROTIME-INR   URINALYSIS   TOXICOLOGY SCREEN, URINE, RANDOM (COMPLIANCE)   ALCOHOL,MEDICAL (ETHANOL)   PHOSPHATIDYLETHANOL (PETH)   POCT URINE PREGNANCY   TYPE & SCREEN   POCT GLUCOSE MONITORING CONTINUOUS        All Lab Results:  Results for orders placed or performed during the hospital encounter of 01/19/18   CBC auto differential   Result Value Ref Range    WBC 2.32 (L) 3.90 - 12.70 K/uL    RBC 2.23 (L) 4.00 - 5.40 M/uL    Hemoglobin 7.6 (L) 12.0 - 16.0 g/dL    Hematocrit 22.0 (L) 37.0 - 48.5 %    MCV 99 (H) 82 - 98 fL    MCH 34.1 (H) 27.0 - 31.0 pg    MCHC 34.5 32.0 - 36.0 g/dL    RDW 31.9 (H) 11.5 - 14.5 %    Platelets 114 (L) 150 - 350 K/uL    MPV 9.5 9.2 - 12.9 fL    Gran # 1.2 (L) 1.8 - 7.7 K/uL    Lymph # 0.9 (L) 1.0 - 4.8 K/uL    Mono # 0.2 (L) 0.3 - 1.0 K/uL    Eos # 0.0 0.0 - 0.5 K/uL    Baso # 0.01 0.00 - 0.20 K/uL    Gran% 56.0 38.0 - 73.0 %    Lymph% 40.1 18.0 - 48.0 %    Mono% 7.8 4.0 - 15.0 %     Eosinophil% 0.0 0.0 - 8.0 %    Basophil% 0.4 0.0 - 1.9 %    Platelet Estimate Decreased (A)     Aniso Moderate     Poik Slight     Poly Occasional     Ovalocytes Occasional     Tear Drop Cells Occasional     Spherocytes Occasional     Differential Method Automated    Comprehensive metabolic panel   Result Value Ref Range    Sodium 138 136 - 145 mmol/L    Potassium 4.4 3.5 - 5.1 mmol/L    Chloride 110 95 - 110 mmol/L    CO2 19 (L) 23 - 29 mmol/L    Glucose 126 (H) 70 - 110 mg/dL    BUN, Bld 17 8 - 23 mg/dL    Creatinine 1.0 0.5 - 1.4 mg/dL    Calcium 8.6 (L) 8.7 - 10.5 mg/dL    Total Protein 7.3 6.0 - 8.4 g/dL    Albumin 3.5 3.5 - 5.2 g/dL    Total Bilirubin 2.0 (H) 0.1 - 1.0 mg/dL    Alkaline Phosphatase 64 55 - 135 U/L    AST 29 10 - 40 U/L    ALT 10 10 - 44 U/L    Anion Gap 9 8 - 16 mmol/L    eGFR if African American >60 >60 mL/min/1.73 m^2    eGFR if non African American 58 (A) >60 mL/min/1.73 m^2   APTT   Result Value Ref Range    aPTT 21.9 21.0 - 32.0 sec   Protime-INR   Result Value Ref Range    Prothrombin Time 10.7 9.0 - 12.5 sec    INR 1.0 0.8 - 1.2   Lactate dehydrogenase   Result Value Ref Range     (H) 110 - 260 U/L   Rapid HIV   Result Value Ref Range    HIV Rapid Testing Negative Negative   Type & Screen   Result Value Ref Range    Group & Rh O POS     Indirect Josy NEG      The EKG was ordered, reviewed, and independently interpreted by the ED provider.  Interpretation time: 23:39  Rate: 95 BPM  Rhythm: normal sinus rhythm  Interpretation: No acute ST changes. No STEMI.    The Emergency Provider reviewed the vital signs and test results, which are outlined above.    ED Discussion   12:35 AM: Re-evaluated pt. I have discussed test results, shared treatment plan, and the need for admission with patient and family at bedside. Pt and family express understanding at this time and agree with all information. All questions answered. Pt and family have no further questions or concerns at this time.  Pt is ready for admit.    12:49 AM: Discussed case with Dr. Montejo ([facility]). Dr. Montejo agrees with current care and management of pt and accepts admission.   Admitting Service: Hospital medicine   Admitting Physician: Dr. Montejo  Admit to: Tele      Rapid hiv, folic acid, ldh,  ED Medication(s):  Medications   0.9%  NaCl infusion (not administered)   pantoprazole injection 40 mg (not administered)   dextrose 50% injection 12.5 g (not administered)   glucagon (human recombinant) injection 1 mg (not administered)   insulin aspart pen 0-5 Units (not administered)   glucose chewable tablet 16 g (not administered)   glucose chewable tablet 24 g (not administered)   dextrose 50% injection 12.5 g (not administered)   dextrose 50% injection 25 g (not administered)   glucagon (human recombinant) injection 1 mg (not administered)       New Prescriptions    No medications on file            Medical Decision Making    Medical Decision Making:   Clinical Tests:   Lab Tests: Ordered and Reviewed  Medical Tests: Ordered and Reviewed           Scribe Attestation:   Scribe #1: I performed the above scribed service and the documentation accurately describes the services I performed. I attest to the accuracy of the note.    Attending:   Physician Attestation Statement for Scribe #1: I, Stephy Hassan DO, personally performed the services described in this documentation, as scribed by Lulu Saucedo, in my presence, and it is both accurate and complete.          Clinical Impression       ICD-10-CM ICD-9-CM   1. Pancytopenia D61.818 284.19   2. Heme positive stool R19.5 792.1       Disposition:   Disposition: Admitted (Tele)  Condition: Fair         Stephy Hassan DO  01/20/18 0223

## 2018-01-20 NOTE — SUBJECTIVE & OBJECTIVE
No past medical history on file.    No past surgical history on file.    Review of patient's allergies indicates:  Allergies not on file    No current facility-administered medications on file prior to encounter.      No current outpatient prescriptions on file prior to encounter.     Family History     None        Social History Main Topics    Smoking status: Not on file    Smokeless tobacco: Not on file    Alcohol use Not on file    Drug use: Unknown    Sexual activity: Not on file     Review of Systems   Constitutional: Negative for chills, diaphoresis, fatigue and fever.   HENT: Negative for congestion, sore throat and voice change.    Eyes: Negative for photophobia and visual disturbance.   Respiratory: Negative for cough, shortness of breath, wheezing and stridor.    Cardiovascular: Negative for chest pain and leg swelling.   Gastrointestinal: Positive for blood in stool. Negative for abdominal distention, abdominal pain, constipation, diarrhea, nausea and vomiting.   Endocrine: Negative for polydipsia, polyphagia and polyuria.   Genitourinary: Negative for difficulty urinating, dysuria, flank pain, pelvic pain, urgency and vaginal discharge.   Musculoskeletal: Negative for back pain, joint swelling, neck pain and neck stiffness.   Skin: Negative for color change and rash.   Allergic/Immunologic: Negative for immunocompromised state.   Neurological: Negative for dizziness, syncope, weakness, numbness and headaches.   Hematological: Does not bruise/bleed easily.   Psychiatric/Behavioral: Negative for agitation, behavioral problems and confusion.     Objective:     Vital Signs (Most Recent):  Temp: 98.5 °F (36.9 °C) (01/19/18 2301)  Pulse: 95 (01/19/18 2301)  Resp: 20 (01/19/18 2301)  BP: (!) 150/68 (01/19/18 2301)  SpO2: 100 % (01/19/18 2301) Vital Signs (24h Range):  Temp:  [98.5 °F (36.9 °C)] 98.5 °F (36.9 °C)  Pulse:  [95] 95  Resp:  [20] 20  SpO2:  [100 %] 100 %  BP: (150)/(68) 150/68     Weight: 71.7  kg (158 lb)  Body mass index is 27.12 kg/m².    Physical Exam   Constitutional: She is oriented to person, place, and time. She appears well-developed and well-nourished. No distress.   HENT:   Head: Normocephalic and atraumatic.   Nose: Nose normal.   Eyes: Conjunctivae and EOM are normal. Pupils are equal, round, and reactive to light. No scleral icterus.   Neck: Normal range of motion. Neck supple. No tracheal deviation present.   Cardiovascular: Normal rate, regular rhythm, normal heart sounds and intact distal pulses.    No murmur heard.  Pulmonary/Chest: Effort normal and breath sounds normal. No stridor. No respiratory distress. She has no wheezes. She has no rales.   Abdominal: Soft. Bowel sounds are normal. She exhibits no distension. There is no tenderness. There is no guarding.   Genitourinary:   Genitourinary Comments: +occult blood    Musculoskeletal: Normal range of motion. She exhibits no edema or deformity.   Neurological: She is alert and oriented to person, place, and time. No cranial nerve deficit.   Skin: Skin is warm and dry. Capillary refill takes less than 2 seconds. No rash noted. She is not diaphoretic.   Psychiatric: She has a normal mood and affect. Her behavior is normal. Judgment and thought content normal.   Nursing note and vitals reviewed.        CRANIAL NERVES     CN III, IV, VI   Pupils are equal, round, and reactive to light.  Extraocular motions are normal.        Significant Labs: All pertinent labs within the past 24 hours have been reviewed.  Results for orders placed or performed during the hospital encounter of 01/19/18   CBC auto differential   Result Value Ref Range    WBC 2.32 (L) 3.90 - 12.70 K/uL    RBC 2.23 (L) 4.00 - 5.40 M/uL    Hemoglobin 7.6 (L) 12.0 - 16.0 g/dL    Hematocrit 22.0 (L) 37.0 - 48.5 %    MCV 99 (H) 82 - 98 fL    MCH 34.1 (H) 27.0 - 31.0 pg    MCHC 34.5 32.0 - 36.0 g/dL    RDW 31.9 (H) 11.5 - 14.5 %    Platelets 114 (L) 150 - 350 K/uL    MPV 9.5 9.2 -  12.9 fL    Gran # 1.2 (L) 1.8 - 7.7 K/uL    Lymph # 0.9 (L) 1.0 - 4.8 K/uL    Mono # 0.2 (L) 0.3 - 1.0 K/uL    Eos # 0.0 0.0 - 0.5 K/uL    Baso # 0.01 0.00 - 0.20 K/uL    Gran% 56.0 38.0 - 73.0 %    Lymph% 40.1 18.0 - 48.0 %    Mono% 7.8 4.0 - 15.0 %    Eosinophil% 0.0 0.0 - 8.0 %    Basophil% 0.4 0.0 - 1.9 %    Platelet Estimate Decreased (A)     Aniso Moderate     Poik Slight     Poly Occasional     Ovalocytes Occasional     Tear Drop Cells Occasional     Spherocytes Occasional     Differential Method Automated    Comprehensive metabolic panel   Result Value Ref Range    Sodium 138 136 - 145 mmol/L    Potassium 4.4 3.5 - 5.1 mmol/L    Chloride 110 95 - 110 mmol/L    CO2 19 (L) 23 - 29 mmol/L    Glucose 126 (H) 70 - 110 mg/dL    BUN, Bld 17 8 - 23 mg/dL    Creatinine 1.0 0.5 - 1.4 mg/dL    Calcium 8.6 (L) 8.7 - 10.5 mg/dL    Total Protein 7.3 6.0 - 8.4 g/dL    Albumin 3.5 3.5 - 5.2 g/dL    Total Bilirubin 2.0 (H) 0.1 - 1.0 mg/dL    Alkaline Phosphatase 64 55 - 135 U/L    AST 29 10 - 40 U/L    ALT 10 10 - 44 U/L    Anion Gap 9 8 - 16 mmol/L    eGFR if African American >60 >60 mL/min/1.73 m^2    eGFR if non African American 58 (A) >60 mL/min/1.73 m^2       Significant Imaging: I have reviewed all pertinent imaging results/findings within the past 24 hours.   Imaging Results    None

## 2018-01-20 NOTE — PROGRESS NOTES
"[facility] Hospital Medicine  Progress Note    Patient Name: Viv Argueta  MRN: [id number]  Patient Class: IP- Inpatient   Admission Date: 1/19/2018  Length of Stay: 0 days  Attending Physician: Kwaku Taylor MD  Primary Care Provider: Primary Doctor No        Subjective:     Principal Problem:Acute blood loss anemia    HPI:  Viv Argueta is a 67 y.o. female patient  is transfer from [facility] for GI services. The patient reports she presented to the ER with complaints of "feeling bad, weak." The patient reports onset "a few days ago." Reports recently around her family member who diagnosed with the Flu ( and son). Documentation sent with patient notes pt was seen in clinic 1/19/2017 prior to ER at [facility]-->Flu neg, H/H 4.5/13.5 WBC 2.2. Was diagnosed with acute bronchitis, anemia and leukoctopenia was sent to [facility] ER. Further eval in [facility] noted +Occult Stool, was tranfused 2 units PRBC, given and 40 IV Protonix. No exacerbating factors reported. Associated sxs include non productive cough. Patient was started on course of Cipro and Pyridim 1/11/18 for UTI. Patient denies fever, chills, congestion, sob, cp, abd pain,  or GI symptoms, and all other sxs at this time. No further complaints or concerns at this time. The patient admitted for pancytopenia, acute blood loss anemia from suspected GI bleed. Labs collected on arrival to [facility] consistent with pancytopenia, AST/ALT normal, LD elevated. Rapid HIV neg. Will further eval with anemia labs, Acute Hep panel given LD elevation and pancytopenia. Also Check Urine as well as ETOH and Drugs-->notes occasional drinker. Check Chest Xray-->independent soft read vascular congestion and ?left low consilidation. Will cover rocephin and azithromycin and check blood cultures    Hospital Course:  On 1/19 patient was admitted to [facility] for Acute Blood Loss Anemia, Pancytopenia, and presumed GI Bleed with " Occult + stool.  Upon admit here her Hgb was 7.6 after receiving 2 units of PRBCs prior to transferring here.  Hemoc and GI were consulted.  Patient is primarily Scottish speaking requiring an interpretor.  Per Hemoc, her markedly elevated LDH suggests either primary liver disease or hemolysis.  Reticulocyte count, haptoglobin and direct Josy pending.  Per GI she would benefit from Endoscopy given she has never had a routine Colonoscopy, but doubtful that Anemia is r/t GI source.  Hgb decreased to 6.9 on 1/20, to receive 1 unit of PRBCs.  Continue Hgb/Hct q 6 hours and transfuse to keep Hgb >7.0.  CXR on admit showed mild cardiomegaly with mild pulmonary edema.  BC pending.  She has remained afebrile and has no evidence of PNA, therefore Azithromycin and Rocephin dc'd.  She was recently treated for a UTI.  Repeat UA here negative for infectious process.  UC pending.      Interval History:  No acute events overnight.  Continues to deny active bleeding, N/V/D, and ABD pain.  Evaluated by Hemoc and GI.  Workups pending. Hgb decreased to 6.9, will transfuse 1 unit PRBCs today.     Review of Systems   Constitutional: Positive for fatigue. Negative for chills, diaphoresis and fever.   HENT: Negative for facial swelling, hearing loss, mouth sores, sneezing, sore throat, tinnitus and trouble swallowing.    Eyes: Negative for photophobia, pain, discharge, redness and visual disturbance.   Respiratory: Negative for apnea, cough, choking, chest tightness, shortness of breath, wheezing and stridor.    Cardiovascular: Negative for chest pain, palpitations and leg swelling.   Gastrointestinal: Negative for abdominal distention, abdominal pain, anal bleeding, blood in stool, constipation, diarrhea, nausea, rectal pain and vomiting.   Endocrine: Negative for cold intolerance, heat intolerance, polydipsia, polyphagia and polyuria.   Genitourinary: Negative for difficulty urinating, dysuria, flank pain, frequency, hematuria, pelvic  pain, urgency, vaginal bleeding, vaginal discharge and vaginal pain.   Musculoskeletal: Negative for arthralgias, back pain, gait problem, myalgias and neck stiffness.   Skin: Negative for pallor, rash and wound.   Allergic/Immunologic: Negative for food allergies.   Neurological: Positive for weakness. Negative for dizziness, tremors, seizures, syncope, speech difficulty and headaches.   Hematological: Negative for adenopathy. Does not bruise/bleed easily.   Psychiatric/Behavioral: Negative for behavioral problems and confusion. The patient is not nervous/anxious.    All other systems reviewed and are negative.    Objective:     Vital Signs (Most Recent):  Temp: 99.6 °F (37.6 °C) (01/20/18 1514)  Pulse: 89 (01/20/18 1514)  Resp: 18 (01/20/18 1514)  BP: (!) 156/74 (01/20/18 1514)  SpO2: 95 % (01/20/18 1514) Vital Signs (24h Range):  Temp:  [97.6 °F (36.4 °C)-99.6 °F (37.6 °C)] 99.6 °F (37.6 °C)  Pulse:  [] 89  Resp:  [18-20] 18  SpO2:  [93 %-100 %] 95 %  BP: (141-180)/(63-84) 156/74     Weight: 69.9 kg (154 lb 1.6 oz)  Body mass index is 26.45 kg/m².    Intake/Output Summary (Last 24 hours) at 01/20/18 1548  Last data filed at 01/20/18 0748   Gross per 24 hour   Intake            217.5 ml   Output              500 ml   Net           -282.5 ml      Physical Exam   Constitutional: She is oriented to person, place, and time. She appears well-developed and well-nourished.   HENT:   Head: Normocephalic and atraumatic.   Mouth/Throat: Oropharynx is clear and moist.   Eyes: Conjunctivae and EOM are normal. Pupils are equal, round, and reactive to light.   Neck: Normal range of motion. Neck supple.   Cardiovascular: Normal rate, regular rhythm, normal heart sounds and intact distal pulses.  Exam reveals no gallop and no friction rub.    No murmur heard.  Pulmonary/Chest: Effort normal and breath sounds normal. No respiratory distress. She has no wheezes. She has no rales. She exhibits no tenderness.   Abdominal: Soft.  Bowel sounds are normal. She exhibits no distension and no mass. There is no tenderness.   Musculoskeletal: Normal range of motion. She exhibits no edema or tenderness.   Neurological: She is alert and oriented to person, place, and time.   Skin: Skin is warm and dry. No rash noted. No erythema.   Psychiatric: She has a normal mood and affect. Her behavior is normal. Judgment and thought content normal.   Nursing note and vitals reviewed.      Significant Labs:   Bilirubin:   Recent Labs  Lab 01/20/18  0007   BILITOT 2.0*     Blood Culture: No results for input(s): LABBLOO in the last 48 hours.  BMP:   Recent Labs  Lab 01/20/18  0433   *  117*     138   K 4.3  4.3     110   CO2 20*  20*   BUN 15  15   CREATININE 0.9  0.9   CALCIUM 8.6*  8.6*     CBC:   Recent Labs  Lab 01/20/18  0007 01/20/18  0433 01/20/18  1127   WBC 2.32* 2.21*  2.21*  --    HGB 7.6* 7.1*  7.1*  7.1* 6.9*   HCT 22.0* 20.8*  20.8*  20.8* 19.9*   * 116*  116*  --      Coagulation:   Recent Labs  Lab 01/20/18  0433   INR 1.0   APTT 21.2     Urine Culture: No results for input(s): LABURIN in the last 48 hours.  Urine Studies:   Recent Labs  Lab 01/20/18  0732   COLORU Yellow   APPEARANCEUA Clear   PHUR 5.0   SPECGRAV 1.015   PROTEINUA Negative   GLUCUA Negative   KETONESU Negative   BILIRUBINUA Negative   OCCULTUA Negative   NITRITE Negative   UROBILINOGEN Negative   LEUKOCYTESUR Negative       Significant Imaging: I have reviewed all pertinent imaging results/findings within the past 24 hours.    Assessment/Plan:      * Acute blood loss anemia    - Hemoc and GI consulted  - Could be r/t Hemolysis or Primary Liver Dx given elevated LDH; per GI, doubtful source is GI in nature  - Hgb decreased to 6.9; will transfuse 1unit PRBCs today  - Continue H/H q 6 hours  - Transfuse as needed to keep Hgb >7.0  - Retic count 5.2  - Josy negative  - Haptoglobin pending  - Monitor  - Supportive care        GI bleed    - OCB  +  - Evaluated by GI today  - Per GI, would benefit from Endoscopy since she has never had any prior screenings  - CL diet after MN  - Continue PPI  - Hgb/Hct q 6 hours  - Transfuse as needed to keep Hgb >7.0  - Monitor        Pancytopenia    - Hemoc consulted  - Daily CBC  - Monitor          DM (diabetes mellitus)    - A1c pending  - Accu checks with SSI PRN  - Hold home Amaryl and Metformin for now  - ADA diet  - Monitor        Hypertension    - BP under fair control  - Continue home Lisinopril  - Monitor          VTE Risk Mitigation         Ordered     Medium Risk of VTE  Once      01/20/18 0102     Place ABHINAV hose  Until discontinued      01/20/18 0102     Place sequential compression device  Until discontinued      01/20/18 0102     Reason for No Pharmacological VTE Prophylaxis  Once     Hold AC given Acute blood loss Anemia 01/20/18 0102              Joseline Perales, DNP, ACNP-BC  Department of Hospital Medicine   [facility]

## 2018-01-20 NOTE — ASSESSMENT & PLAN NOTE
Heme positive stool, but this is doubtful to be acute blood loss anemia. Hematology evaluating. The patient has no gross evidence of GI source of this magnitude but since she has never been evaluated before would benefit from assessment especially since stool positive. Agree with transfusion. Await results of hematology workup. Endoscopy when stable respiratory wise and resuscitated.

## 2018-01-20 NOTE — SUBJECTIVE & OBJECTIVE
Interval History:  No acute events overnight.  Continues to deny active bleeding, N/V/D, and ABD pain.  Evaluated by Hemoc and GI.  Workups pending. Hgb decreased to 6.9, will transfuse 1 unit PRBCs today.     Review of Systems   Constitutional: Positive for fatigue. Negative for chills, diaphoresis and fever.   HENT: Negative for facial swelling, hearing loss, mouth sores, sneezing, sore throat, tinnitus and trouble swallowing.    Eyes: Negative for photophobia, pain, discharge, redness and visual disturbance.   Respiratory: Negative for apnea, cough, choking, chest tightness, shortness of breath, wheezing and stridor.    Cardiovascular: Negative for chest pain, palpitations and leg swelling.   Gastrointestinal: Negative for abdominal distention, abdominal pain, anal bleeding, blood in stool, constipation, diarrhea, nausea, rectal pain and vomiting.   Endocrine: Negative for cold intolerance, heat intolerance, polydipsia, polyphagia and polyuria.   Genitourinary: Negative for difficulty urinating, dysuria, flank pain, frequency, hematuria, pelvic pain, urgency, vaginal bleeding, vaginal discharge and vaginal pain.   Musculoskeletal: Negative for arthralgias, back pain, gait problem, myalgias and neck stiffness.   Skin: Negative for pallor, rash and wound.   Allergic/Immunologic: Negative for food allergies.   Neurological: Positive for weakness. Negative for dizziness, tremors, seizures, syncope, speech difficulty and headaches.   Hematological: Negative for adenopathy. Does not bruise/bleed easily.   Psychiatric/Behavioral: Negative for behavioral problems and confusion. The patient is not nervous/anxious.    All other systems reviewed and are negative.    Objective:     Vital Signs (Most Recent):  Temp: 99.6 °F (37.6 °C) (01/20/18 1514)  Pulse: 89 (01/20/18 1514)  Resp: 18 (01/20/18 1514)  BP: (!) 156/74 (01/20/18 1514)  SpO2: 95 % (01/20/18 1514) Vital Signs (24h Range):  Temp:  [97.6 °F (36.4 °C)-99.6 °F (37.6  °C)] 99.6 °F (37.6 °C)  Pulse:  [] 89  Resp:  [18-20] 18  SpO2:  [93 %-100 %] 95 %  BP: (141-180)/(63-84) 156/74     Weight: 69.9 kg (154 lb 1.6 oz)  Body mass index is 26.45 kg/m².    Intake/Output Summary (Last 24 hours) at 01/20/18 1548  Last data filed at 01/20/18 0748   Gross per 24 hour   Intake            217.5 ml   Output              500 ml   Net           -282.5 ml      Physical Exam   Constitutional: She is oriented to person, place, and time. She appears well-developed and well-nourished.   HENT:   Head: Normocephalic and atraumatic.   Mouth/Throat: Oropharynx is clear and moist.   Eyes: Conjunctivae and EOM are normal. Pupils are equal, round, and reactive to light.   Neck: Normal range of motion. Neck supple.   Cardiovascular: Normal rate, regular rhythm, normal heart sounds and intact distal pulses.  Exam reveals no gallop and no friction rub.    No murmur heard.  Pulmonary/Chest: Effort normal and breath sounds normal. No respiratory distress. She has no wheezes. She has no rales. She exhibits no tenderness.   Abdominal: Soft. Bowel sounds are normal. She exhibits no distension and no mass. There is no tenderness.   Musculoskeletal: Normal range of motion. She exhibits no edema or tenderness.   Neurological: She is alert and oriented to person, place, and time.   Skin: Skin is warm and dry. No rash noted. No erythema.   Psychiatric: She has a normal mood and affect. Her behavior is normal. Judgment and thought content normal.   Nursing note and vitals reviewed.      Significant Labs:   Bilirubin:   Recent Labs  Lab 01/20/18  0007   BILITOT 2.0*     Blood Culture: No results for input(s): LABBLOO in the last 48 hours.  BMP:   Recent Labs  Lab 01/20/18  0433   *  117*     138   K 4.3  4.3     110   CO2 20*  20*   BUN 15  15   CREATININE 0.9  0.9   CALCIUM 8.6*  8.6*     CBC:   Recent Labs  Lab 01/20/18  0007 01/20/18  0433 01/20/18  1127   WBC 2.32* 2.21*  2.21*  --     HGB 7.6* 7.1*  7.1*  7.1* 6.9*   HCT 22.0* 20.8*  20.8*  20.8* 19.9*   * 116*  116*  --      Coagulation:   Recent Labs  Lab 01/20/18  0433   INR 1.0   APTT 21.2     Urine Culture: No results for input(s): LABURIN in the last 48 hours.  Urine Studies:   Recent Labs  Lab 01/20/18  0732   COLORU Yellow   APPEARANCEUA Clear   PHUR 5.0   SPECGRAV 1.015   PROTEINUA Negative   GLUCUA Negative   KETONESU Negative   BILIRUBINUA Negative   OCCULTUA Negative   NITRITE Negative   UROBILINOGEN Negative   LEUKOCYTESUR Negative       Significant Imaging: I have reviewed all pertinent imaging results/findings within the past 24 hours.

## 2018-01-20 NOTE — H&P
"[facility] Hospital Medicine  History & Physical    Patient Name: Viv Argueta  MRN: [id number]  Admission Date: 1/19/2018  Attending Physician: Isiah Montejo MD  Primary Care Provider: Primary Doctor No         Patient information was obtained from patient, past medical records and ER records.     Subjective:     Principal Problem:GI bleed    Chief Complaint:   Chief Complaint   Patient presents with    GI Bleeding     transfer from [facility]        HPI: Viv Argueta is a 67 y.o. female patient  is transfer from [facility] for GI services. The patient reports she presented to the ER with complaints of "feeling bad, weak." The patient reports onset "a few days ago." Reports recently around her family member who diagnosed with the Flu ( and son). Documentation sent with patient notes pt was seen in clinic 1/19/2017 prior to ER at [facility]-->Flu neg, H/H 4.5/13.5 WBC 2.2. Was diagnosed with acute bronchitis, anemia and leukoctopenia was sent to [facility] ER. Further eval in [facility] noted +Occult Stool, was tranfused 2 units PRBC, given and 40 IV Protonix. No exacerbating factors reported. Associated sxs include non productive cough. Patient was started on course of Cipro and Pyridim 1/11/18 for UTI. Patient denies fever, chills, congestion, sob, cp, abd pain,  or GI symptoms, and all other sxs at this time. No further complaints or concerns at this time. The patient admitted for pancytopenia, acute blood loss anemia from suspected GI bleed. Labs collected on arrival to [facility] consistent with pancytopenia, AST/ALT normal, LD elevated. Rapid HIV neg. Will further eval with anemia labs, Acute Hep panel given LD elevation and pancytopenia. Also Check Urine as well as ETOH and Drugs-->notes occasional drinker. Check Chest Xray-->independent soft read vascular congestion and ?left low consilidation. Will cover rocephin and azithromycin and check blood " cultures    Past Medical History:   Diagnosis Date    Diabetes mellitus     Hypertension      Past Surgical History:   Procedure Laterality Date     SECTION       Review of patient's allergies indicates:  No Known Allergies      No current facility-administered medications on file prior to encounter.      Outpatient prescriptions on file prior to encounter.  Amaryl 2mg tab daily  Cipro 500mg q12   Lisinopril 10mg daily  Metformin 1000mg daily  Pyridium 200mg TID PRN     Family History     Reviewed and not Pertinent           Social History Main Topics    Smoking status: Not on file    Smokeless tobacco: Not on file    Alcohol use Not on file    Drug use: Unknown    Sexual activity: Not on file     Review of Systems   Constitutional: Negative for chills, diaphoresis, fatigue and fever.   HENT: Negative for congestion, sore throat and voice change.    Eyes: Negative for photophobia and visual disturbance.   Respiratory: Negative for cough, shortness of breath, wheezing and stridor.    Cardiovascular: Negative for chest pain and leg swelling.   Gastrointestinal: Positive for blood in stool. Negative for abdominal distention, abdominal pain, constipation, diarrhea, nausea and vomiting.   Endocrine: Negative for polydipsia, polyphagia and polyuria.   Genitourinary: Negative for difficulty urinating, dysuria, flank pain, pelvic pain, urgency and vaginal discharge.   Musculoskeletal: Negative for back pain, joint swelling, neck pain and neck stiffness.   Skin: Negative for color change and rash.   Allergic/Immunologic: Negative for immunocompromised state.   Neurological: Negative for dizziness, syncope, weakness, numbness and headaches.   Hematological: Does not bruise/bleed easily.   Psychiatric/Behavioral: Negative for agitation, behavioral problems and confusion.     Objective:     Vital Signs (Most Recent):  Temp: 98.5 °F (36.9 °C) (18)  Pulse: 95 (18)  Resp: 20 (18  2301)  BP: (!) 150/68 (01/19/18 2301)  SpO2: 100 % (01/19/18 2301) Vital Signs (24h Range):  Temp:  [98.5 °F (36.9 °C)] 98.5 °F (36.9 °C)  Pulse:  [95] 95  Resp:  [20] 20  SpO2:  [100 %] 100 %  BP: (150)/(68) 150/68     Weight: 71.7 kg (158 lb)  Body mass index is 27.12 kg/m².    Physical Exam   Constitutional: She is oriented to person, place, and time. She appears well-developed. No distress. Ill appearing  HENT:   Head: Normocephalic and atraumatic.   Nose: Nose normal.   Eyes: Conjunctivae and EOM are normal. Pupils are equal, round, and reactive to light. No scleral icterus.   Neck: Normal range of motion. Neck supple. No tracheal deviation present.   Cardiovascular: Normal rate, regular rhythm, normal heart sounds and intact distal pulses.    No murmur heard.  Pulmonary/Chest: Effort normal and breath sounds normal. No stridor. No respiratory distress. She has no wheezes. She has no rales. Decreased Aeration throughout, mild course left lower.   Abdominal: Soft. Bowel sounds are normal. She exhibits no distension. There is no tenderness. There is no guarding.   Genitourinary:   Genitourinary Comments: +occult blood    Musculoskeletal: Normal range of motion. She exhibits no edema or deformity.   Neurological: She is alert and oriented to person, place, and time. No cranial nerve deficit.   Skin: Skin is warm and dry. Capillary refill takes less than 2 seconds. No rash noted. She is not diaphoretic. Pale  Psychiatric: She has a normal mood and affect. Her behavior is normal. Judgment and thought content normal.   Nursing note and vitals reviewed.        CRANIAL NERVES     CN III, IV, VI   Pupils are equal, round, and reactive to light.  Extraocular motions are normal.        Significant Labs: All pertinent labs within the past 24 hours have been reviewed.  Results for orders placed or performed during the hospital encounter of 01/19/18   CBC auto differential   Result Value Ref Range    WBC 2.32 (L) 3.90 -  12.70 K/uL    RBC 2.23 (L) 4.00 - 5.40 M/uL    Hemoglobin 7.6 (L) 12.0 - 16.0 g/dL    Hematocrit 22.0 (L) 37.0 - 48.5 %    MCV 99 (H) 82 - 98 fL    MCH 34.1 (H) 27.0 - 31.0 pg    MCHC 34.5 32.0 - 36.0 g/dL    RDW 31.9 (H) 11.5 - 14.5 %    Platelets 114 (L) 150 - 350 K/uL    MPV 9.5 9.2 - 12.9 fL    Gran # 1.2 (L) 1.8 - 7.7 K/uL    Lymph # 0.9 (L) 1.0 - 4.8 K/uL    Mono # 0.2 (L) 0.3 - 1.0 K/uL    Eos # 0.0 0.0 - 0.5 K/uL    Baso # 0.01 0.00 - 0.20 K/uL    Gran% 56.0 38.0 - 73.0 %    Lymph% 40.1 18.0 - 48.0 %    Mono% 7.8 4.0 - 15.0 %    Eosinophil% 0.0 0.0 - 8.0 %    Basophil% 0.4 0.0 - 1.9 %    Platelet Estimate Decreased (A)     Aniso Moderate     Poik Slight     Poly Occasional     Ovalocytes Occasional     Tear Drop Cells Occasional     Spherocytes Occasional     Differential Method Automated    Comprehensive metabolic panel   Result Value Ref Range    Sodium 138 136 - 145 mmol/L    Potassium 4.4 3.5 - 5.1 mmol/L    Chloride 110 95 - 110 mmol/L    CO2 19 (L) 23 - 29 mmol/L    Glucose 126 (H) 70 - 110 mg/dL    BUN, Bld 17 8 - 23 mg/dL    Creatinine 1.0 0.5 - 1.4 mg/dL    Calcium 8.6 (L) 8.7 - 10.5 mg/dL    Total Protein 7.3 6.0 - 8.4 g/dL    Albumin 3.5 3.5 - 5.2 g/dL    Total Bilirubin 2.0 (H) 0.1 - 1.0 mg/dL    Alkaline Phosphatase 64 55 - 135 U/L    AST 29 10 - 40 U/L    ALT 10 10 - 44 U/L    Anion Gap 9 8 - 16 mmol/L    eGFR if African American >60 >60 mL/min/1.73 m^2    eGFR if non African American 58 (A) >60 mL/min/1.73 m^2   APTT   Result Value Ref Range    aPTT 21.9 21.0 - 32.0 sec   Protime-INR   Result Value Ref Range    Prothrombin Time 10.7 9.0 - 12.5 sec    INR 1.0 0.8 - 1.2   Lactate dehydrogenase   Result Value Ref Range     (H) 110 - 260 U/L   Rapid HIV   Result Value Ref Range    HIV Rapid Testing Negative Negative   Type & Screen   Result Value Ref Range    Group & Rh O POS     Indirect Josy NEG    Prepare RBC 1 Unit   Result Value Ref Range    UNIT NUMBER [id number]     PRODUCT CODE  [id number]     DISPENSE STATUS CROSSMATCHED     CODING SYSTEM XFTF299     Unit Blood Type Code 5100     Unit Blood Type O POS     Unit Expiration [id number]          Significant Imaging: I have reviewed all pertinent imaging results/findings within the past 24 hours.   Imaging Results          X-Ray Chest PA And Lateral     Preliminary independent soft read: vascular congestion, ?left lower consolidation            I have personally reviewed the patients labs, imaging, and discussed the patient case in detail with the Er provider        Assessment/Plan:     * GI bleed    GI consult  Transfuse  PPI  NPO        Acute blood loss anemia    Check anemia labs  Transfuse as need  Consider disease processes   Check Hep Panel   Cover for resp/gu infectious process; cultures pending  Consult GI and Heme/Oncology        Pancytopenia    Further Eval pending  Check Hep Panel, folate, Vitamin B12, HIV  Blood Cultures   Cover with Rocephin and Azithromycin given HPI   Heme/oncology consult  Consider medication profile        Chest x-ray abnormality    Rad read pending  Preliminary independent soft read: vascular congestion and ?left lower consolidation   Nebs and Mucinex  Rocephin and Azithromycin   Monitor           Hypertension    Stable continue  Home meds     DM  Hold oral meds, consider continued use pending course given poss. Hepatic process  Check a1c  ssi      VTE Risk Mitigation         Ordered     Medium Risk of VTE  Once      01/20/18 0102     Place ABHINAV hose  Until discontinued      01/20/18 0102     Place sequential compression device  Until discontinued      01/20/18 0102     Reason for No Pharmacological VTE Prophylaxis  Once      01/20/18 0102             Brian Resendiz NP  Department of Hospital Medicine   [facility]

## 2018-01-20 NOTE — ED NOTES
Patient identifies self as Viv Argueta      LOC: The patient is awake, alert and aware of environment with an appropriate affect, the patient is oriented x 3 and speaking appropriately.  APPEARANCE: Patient resting comfortably and in no acute distress, patient is clean and well groomed, patient's clothing is properly fastened.  SKIN: The skin is warm and dry, color consistent with ethnicity, patient has normal skin turgor and moist mucus membranes, skin intact, no breakdown or bruising noted.  MUSCULOSKELETAL: Patient moving all extremities well, no obvious swelling or deformities noted. Patient c/o weakness.  RESPIRATORY: Airway is open and patent, respirations are spontaneous, patient has a normal effort and rate, no accessory muscle use noted. Coarse breath sounds noted throughout all lung feilds.   CARDIAC: Patient has a normal rate and rhythm, no periphreal edema noted, capillary refill < 3 seconds.  ABDOMEN: Soft and non tender to palpation, no distention noted. Patient c/o dark tary stools  NEUROLOGIC: eyes open spontaneously, behavior appropriate to situation, follows commands, facial expression symmetrical, bilateral hand grasp equal and even, purposeful motor response noted, normal sensation in all extremities when touched with a finger

## 2018-01-20 NOTE — ASSESSMENT & PLAN NOTE
Reviewed note from Hematology. Agree with their recommendation. Increased LDH and bilirubin. Being worked up for hemolysis.

## 2018-01-20 NOTE — SUBJECTIVE & OBJECTIVE
Oncology Treatment Plan:   [No treatment plan]    Medications:  Continuous Infusions:   sodium chloride 0.9% 50 mL/hr at 18 0239     Scheduled Meds:   albuterol-ipratropium 2.5mg-0.5mg/3mL  3 mL Nebulization Q8H    azithromycin  500 mg Intravenous Q24H    cefTRIAXone (ROCEPHIN) IVPB  1 g Intravenous Q24H    guaiFENesin  600 mg Oral BID    lisinopril  10 mg Oral Daily    pantoprazole  40 mg Intravenous BID     PRN Meds:dextrose 50%, dextrose 50%, dextrose 50%, glucagon (human recombinant), glucagon (human recombinant), glucose, glucose, hydrALAZINE, insulin aspart     Review of patient's allergies indicates:  No Known Allergies     Past Medical History:   Diagnosis Date    Diabetes mellitus     Hypertension      Past Surgical History:   Procedure Laterality Date     SECTION       Family History     Family history is unknown by patient.        Social History Main Topics    Smoking status: Never Smoker    Smokeless tobacco: Never Used    Alcohol use Yes      Comment: occasional    Drug use: No    Sexual activity: No       Review of Systems   Constitutional: Positive for activity change and fatigue. Negative for appetite change, chills, diaphoresis, fever and unexpected weight change.   HENT: Negative for congestion, dental problem, drooling, ear discharge, ear pain, facial swelling, hearing loss, mouth sores, nosebleeds, postnasal drip, rhinorrhea, sinus pressure, sneezing, sore throat, tinnitus, trouble swallowing and voice change.    Eyes: Negative for photophobia, pain, discharge, redness, itching and visual disturbance.   Respiratory: Negative for cough, choking, chest tightness, shortness of breath, wheezing and stridor.    Cardiovascular: Negative for chest pain, palpitations and leg swelling.   Gastrointestinal: Negative for abdominal distention, abdominal pain, anal bleeding, blood in stool, constipation, diarrhea, nausea, rectal pain and vomiting.   Endocrine: Negative for cold  intolerance, heat intolerance, polydipsia, polyphagia and polyuria.   Genitourinary: Negative for decreased urine volume, difficulty urinating, dyspareunia, dysuria, enuresis, flank pain, frequency, genital sores, hematuria, menstrual problem, pelvic pain, urgency, vaginal bleeding, vaginal discharge and vaginal pain.   Musculoskeletal: Negative for arthralgias, back pain, gait problem, joint swelling, myalgias, neck pain and neck stiffness.   Skin: Negative for color change, pallor and rash.   Allergic/Immunologic: Negative for environmental allergies, food allergies and immunocompromised state.   Neurological: Positive for weakness. Negative for dizziness, tremors, seizures, syncope, facial asymmetry, speech difficulty, light-headedness, numbness and headaches.   Hematological: Negative for adenopathy. Does not bruise/bleed easily.   Psychiatric/Behavioral: Positive for decreased concentration and dysphoric mood. Negative for agitation, behavioral problems, confusion, hallucinations, self-injury, sleep disturbance and suicidal ideas. The patient is nervous/anxious. The patient is not hyperactive.      Objective:     Vital Signs (Most Recent):  Temp: 98.4 °F (36.9 °C) (01/20/18 0722)  Pulse: 91 (01/20/18 0722)  Resp: 18 (01/20/18 0722)  BP: (!) 141/63 (01/20/18 0722)  SpO2: 96 % (01/20/18 0722) Vital Signs (24h Range):  Temp:  [97.9 °F (36.6 °C)-98.9 °F (37.2 °C)] 98.4 °F (36.9 °C)  Pulse:  [] 91  Resp:  [18-20] 18  SpO2:  [93 %-100 %] 96 %  BP: (141-180)/(63-84) 141/63     Weight: 69.9 kg (154 lb 1.6 oz)  Body mass index is 26.45 kg/m².  Body surface area is 1.78 meters squared.      Intake/Output Summary (Last 24 hours) at 01/20/18 0927  Last data filed at 01/20/18 0748   Gross per 24 hour   Intake            217.5 ml   Output              500 ml   Net           -282.5 ml       Physical Exam   Constitutional: She is oriented to person, place, and time. She has a sickly appearance. She appears ill. She  appears distressed.   HENT:   Head: Normocephalic and atraumatic.   Right Ear: External ear normal.   Left Ear: External ear normal.   Nose: Nose normal. Right sinus exhibits no maxillary sinus tenderness and no frontal sinus tenderness. Left sinus exhibits no maxillary sinus tenderness and no frontal sinus tenderness.   Mouth/Throat: Oropharynx is clear and moist. No oropharyngeal exudate.   Eyes: Conjunctivae, EOM and lids are normal. Pupils are equal, round, and reactive to light. Right eye exhibits no discharge. Left eye exhibits no discharge. Right conjunctiva is not injected. Right conjunctiva has no hemorrhage. Left conjunctiva is not injected. Left conjunctiva has no hemorrhage. No scleral icterus.   Neck: Normal range of motion. Neck supple. No JVD present. No tracheal deviation present. No thyromegaly present.   Cardiovascular: Normal rate, regular rhythm, normal heart sounds and intact distal pulses.    Pulmonary/Chest: Effort normal and breath sounds normal. No stridor. No respiratory distress. She exhibits no tenderness.   Abdominal: Soft. Bowel sounds are normal. She exhibits no distension and no mass. There is no splenomegaly or hepatomegaly. There is no tenderness. There is no rebound.   Musculoskeletal: Normal range of motion. She exhibits no edema or tenderness.   Lymphadenopathy:     She has no cervical adenopathy.     She has no axillary adenopathy.        Right: No supraclavicular adenopathy present.        Left: No supraclavicular adenopathy present.   Neurological: She is alert and oriented to person, place, and time. No cranial nerve deficit. Coordination normal.   Skin: Skin is dry. No rash noted. She is not diaphoretic. No erythema.   Psychiatric: Her behavior is normal. Judgment and thought content normal. Her mood appears anxious. She exhibits a depressed mood.   Vitals reviewed.      Significant Labs:   BMP:   Recent Labs  Lab 01/20/18  0007 01/20/18  0433   * 117*  117*     138  138   K 4.4 4.3  4.3    110  110   CO2 19* 20*  20*   BUN 17 15  15   CREATININE 1.0 0.9  0.9   CALCIUM 8.6* 8.6*  8.6*   , CBC:   Recent Labs  Lab 01/20/18  0007 01/20/18  0433   WBC 2.32* 2.21*  2.21*   HGB 7.6* 7.1*  7.1*  7.1*   HCT 22.0* 20.8*  20.8*  20.8*   * 116*  116*    and CMP:   Recent Labs  Lab 01/20/18  0007 01/20/18  0433    138  138   K 4.4 4.3  4.3    110  110   CO2 19* 20*  20*   * 117*  117*   BUN 17 15  15   CREATININE 1.0 0.9  0.9   CALCIUM 8.6* 8.6*  8.6*   PROT 7.3  --    ALBUMIN 3.5  --    BILITOT 2.0*  --    ALKPHOS 64  --    AST 29  --    ALT 10  --    ANIONGAP 9 8  8   EGFRNONAA 58* >60  >60       Diagnostic Results:  I have reviewed and interpreted all pertinent imaging results/findings within the past 24 hours.

## 2018-01-20 NOTE — ASSESSMENT & PLAN NOTE
Rad read pending  Preliminary independent soft read: vascular congestion and ?left lower consolidation   Nebs and Mucinex  Rocephin and Azithromycin   Monitor

## 2018-01-20 NOTE — CONSULTS
[facility]  Hematology/Oncology  Consult Note    Patient Name: Viv Argueta  MRN: [id number]  Admission Date: 2018  Hospital Length of Stay: 0 days  Code Status: Full Code   Attending Provider: Kwaku Taylor MD  Consulting Provider: David Abdi MD  Primary Care Physician: Primary Doctor No  Principal Problem:GI bleed    Consults  Subjective:     HPI:  67-year-old female transferred from [place] through the [facility].  For GI evaluation.  The patient was found to be pancytopenic I was asked see the patient for further evaluation.  Patient's history although limited by her ability to communicate with me stating that she was doing recently well until the last several days when she's been having increasing fatigue and weakness    Oncology Treatment Plan:   [No treatment plan]    Medications:  Continuous Infusions:   sodium chloride 0.9% 50 mL/hr at 18 0239     Scheduled Meds:   albuterol-ipratropium 2.5mg-0.5mg/3mL  3 mL Nebulization Q8H    azithromycin  500 mg Intravenous Q24H    cefTRIAXone (ROCEPHIN) IVPB  1 g Intravenous Q24H    guaiFENesin  600 mg Oral BID    lisinopril  10 mg Oral Daily    pantoprazole  40 mg Intravenous BID     PRN Meds:dextrose 50%, dextrose 50%, dextrose 50%, glucagon (human recombinant), glucagon (human recombinant), glucose, glucose, hydrALAZINE, insulin aspart     Review of patient's allergies indicates:  No Known Allergies     Past Medical History:   Diagnosis Date    Diabetes mellitus     Hypertension      Past Surgical History:   Procedure Laterality Date     SECTION       Family History     Family history is unknown by patient.        Social History Main Topics    Smoking status: Never Smoker    Smokeless tobacco: Never Used    Alcohol use Yes      Comment: occasional    Drug use: No    Sexual activity: No       Review of Systems   Constitutional: Positive for activity change and fatigue.  Negative for appetite change, chills, diaphoresis, fever and unexpected weight change.   HENT: Negative for congestion, dental problem, drooling, ear discharge, ear pain, facial swelling, hearing loss, mouth sores, nosebleeds, postnasal drip, rhinorrhea, sinus pressure, sneezing, sore throat, tinnitus, trouble swallowing and voice change.    Eyes: Negative for photophobia, pain, discharge, redness, itching and visual disturbance.   Respiratory: Negative for cough, choking, chest tightness, shortness of breath, wheezing and stridor.    Cardiovascular: Negative for chest pain, palpitations and leg swelling.   Gastrointestinal: Negative for abdominal distention, abdominal pain, anal bleeding, blood in stool, constipation, diarrhea, nausea, rectal pain and vomiting.   Endocrine: Negative for cold intolerance, heat intolerance, polydipsia, polyphagia and polyuria.   Genitourinary: Negative for decreased urine volume, difficulty urinating, dyspareunia, dysuria, enuresis, flank pain, frequency, genital sores, hematuria, menstrual problem, pelvic pain, urgency, vaginal bleeding, vaginal discharge and vaginal pain.   Musculoskeletal: Negative for arthralgias, back pain, gait problem, joint swelling, myalgias, neck pain and neck stiffness.   Skin: Negative for color change, pallor and rash.   Allergic/Immunologic: Negative for environmental allergies, food allergies and immunocompromised state.   Neurological: Positive for weakness. Negative for dizziness, tremors, seizures, syncope, facial asymmetry, speech difficulty, light-headedness, numbness and headaches.   Hematological: Negative for adenopathy. Does not bruise/bleed easily.   Psychiatric/Behavioral: Positive for decreased concentration and dysphoric mood. Negative for agitation, behavioral problems, confusion, hallucinations, self-injury, sleep disturbance and suicidal ideas. The patient is nervous/anxious. The patient is not hyperactive.      Objective:     Vital  Signs (Most Recent):  Temp: 98.4 °F (36.9 °C) (01/20/18 0722)  Pulse: 91 (01/20/18 0722)  Resp: 18 (01/20/18 0722)  BP: (!) 141/63 (01/20/18 0722)  SpO2: 96 % (01/20/18 0722) Vital Signs (24h Range):  Temp:  [97.9 °F (36.6 °C)-98.9 °F (37.2 °C)] 98.4 °F (36.9 °C)  Pulse:  [] 91  Resp:  [18-20] 18  SpO2:  [93 %-100 %] 96 %  BP: (141-180)/(63-84) 141/63     Weight: 69.9 kg (154 lb 1.6 oz)  Body mass index is 26.45 kg/m².  Body surface area is 1.78 meters squared.      Intake/Output Summary (Last 24 hours) at 01/20/18 0927  Last data filed at 01/20/18 0748   Gross per 24 hour   Intake            217.5 ml   Output              500 ml   Net           -282.5 ml       Physical Exam   Constitutional: She is oriented to person, place, and time. She has a sickly appearance. She appears ill. She appears distressed.   HENT:   Head: Normocephalic and atraumatic.   Right Ear: External ear normal.   Left Ear: External ear normal.   Nose: Nose normal. Right sinus exhibits no maxillary sinus tenderness and no frontal sinus tenderness. Left sinus exhibits no maxillary sinus tenderness and no frontal sinus tenderness.   Mouth/Throat: Oropharynx is clear and moist. No oropharyngeal exudate.   Eyes: Conjunctivae, EOM and lids are normal. Pupils are equal, round, and reactive to light. Right eye exhibits no discharge. Left eye exhibits no discharge. Right conjunctiva is not injected. Right conjunctiva has no hemorrhage. Left conjunctiva is not injected. Left conjunctiva has no hemorrhage. No scleral icterus.   Neck: Normal range of motion. Neck supple. No JVD present. No tracheal deviation present. No thyromegaly present.   Cardiovascular: Normal rate, regular rhythm, normal heart sounds and intact distal pulses.    Pulmonary/Chest: Effort normal and breath sounds normal. No stridor. No respiratory distress. She exhibits no tenderness.   Abdominal: Soft. Bowel sounds are normal. She exhibits no distension and no mass. There is no  splenomegaly or hepatomegaly. There is no tenderness. There is no rebound.   Musculoskeletal: Normal range of motion. She exhibits no edema or tenderness.   Lymphadenopathy:     She has no cervical adenopathy.     She has no axillary adenopathy.        Right: No supraclavicular adenopathy present.        Left: No supraclavicular adenopathy present.   Neurological: She is alert and oriented to person, place, and time. No cranial nerve deficit. Coordination normal.   Skin: Skin is dry. No rash noted. She is not diaphoretic. No erythema.   Psychiatric: Her behavior is normal. Judgment and thought content normal. Her mood appears anxious. She exhibits a depressed mood.   Vitals reviewed.      Significant Labs:   BMP:   Recent Labs  Lab 01/20/18 0007 01/20/18  0433   * 117*  117*    138  138   K 4.4 4.3  4.3    110  110   CO2 19* 20*  20*   BUN 17 15  15   CREATININE 1.0 0.9  0.9   CALCIUM 8.6* 8.6*  8.6*   , CBC:   Recent Labs  Lab 01/20/18 0007 01/20/18  0433   WBC 2.32* 2.21*  2.21*   HGB 7.6* 7.1*  7.1*  7.1*   HCT 22.0* 20.8*  20.8*  20.8*   * 116*  116*    and CMP:   Recent Labs  Lab 01/20/18 0007 01/20/18  0433    138  138   K 4.4 4.3  4.3    110  110   CO2 19* 20*  20*   * 117*  117*   BUN 17 15  15   CREATININE 1.0 0.9  0.9   CALCIUM 8.6* 8.6*  8.6*   PROT 7.3  --    ALBUMIN 3.5  --    BILITOT 2.0*  --    ALKPHOS 64  --    AST 29  --    ALT 10  --    ANIONGAP 9 8  8   EGFRNONAA 58* >60  >60       Diagnostic Results:  I have reviewed and interpreted all pertinent imaging results/findings within the past 24 hours.    Assessment/Plan:     Pancytopenia    The patient appears to be pancytopenic review of her peripheral smear reveals no marked evidence of abnormal peripheral blood count.  Markedly elevated LDH suggests either primary liver disease or hemolysis will check reticulocyte count and haptoglobin and direct Josy.  Continue to follow             Thank you for your consult. I will follow-up with patient. Please contact us if you have any additional questions.    David Abdi MD  Hematology/Oncology  [facility]

## 2018-01-20 NOTE — ASSESSMENT & PLAN NOTE
- A1c pending  - Accu checks with SSI PRN  - Hold home Amaryl and Metformin for now  - ADA diet  - Monitor

## 2018-01-20 NOTE — ASSESSMENT & PLAN NOTE
Check anemia labs  Transfuse as need  Consider disease processes   Check Hep Panel   Cover for resp/gu infectious process; cultures pending  Consult GI and Heme/Oncology

## 2018-01-20 NOTE — ASSESSMENT & PLAN NOTE
Further Eval pending  Check Hep Panel  Blood Cultures   Cover with Rocephin and Azithromycin given HPI   Heme/oncology consult

## 2018-01-21 LAB
ANION GAP SERPL CALC-SCNC: 7 MMOL/L
ANISOCYTOSIS BLD QL SMEAR: ABNORMAL
BACTERIA UR CULT: NO GROWTH
BASOPHILS # BLD AUTO: 0.01 K/UL
BASOPHILS NFR BLD: 0.5 %
BUN SERPL-MCNC: 13 MG/DL
CALCIUM SERPL-MCNC: 8.8 MG/DL
CHLORIDE SERPL-SCNC: 110 MMOL/L
CO2 SERPL-SCNC: 22 MMOL/L
CREAT SERPL-MCNC: 0.9 MG/DL
DACRYOCYTES BLD QL SMEAR: ABNORMAL
DIFFERENTIAL METHOD: ABNORMAL
EOSINOPHIL # BLD AUTO: 0 K/UL
EOSINOPHIL NFR BLD: 0 %
ERYTHROCYTE [DISTWIDTH] IN BLOOD BY AUTOMATED COUNT: 29.9 %
EST. GFR  (AFRICAN AMERICAN): >60 ML/MIN/1.73 M^2
EST. GFR  (NON AFRICAN AMERICAN): >60 ML/MIN/1.73 M^2
GLUCOSE SERPL-MCNC: 114 MG/DL
HCT VFR BLD AUTO: 21.8 %
HCT VFR BLD AUTO: 23.6 %
HCT VFR BLD AUTO: 23.6 %
HCT VFR BLD AUTO: 23.9 %
HGB BLD-MCNC: 7.6 G/DL
HGB BLD-MCNC: 8 G/DL
HGB BLD-MCNC: 8.1 G/DL
HGB BLD-MCNC: 8.2 G/DL
LYMPHOCYTES # BLD AUTO: 1.1 K/UL
LYMPHOCYTES NFR BLD: 52.5 %
MCH RBC QN AUTO: 34.3 PG
MCHC RBC AUTO-ENTMCNC: 34.7 G/DL
MCV RBC AUTO: 99 FL
MONOCYTES # BLD AUTO: 0.2 K/UL
MONOCYTES NFR BLD: 10.5 %
NEUTROPHILS # BLD AUTO: 0.7 K/UL
NEUTROPHILS NFR BLD: 38.5 %
OVALOCYTES BLD QL SMEAR: ABNORMAL
PLATELET # BLD AUTO: 105 K/UL
PLATELET BLD QL SMEAR: ABNORMAL
PMV BLD AUTO: 9.7 FL
POCT GLUCOSE: 105 MG/DL (ref 70–110)
POCT GLUCOSE: 138 MG/DL (ref 70–110)
POCT GLUCOSE: 140 MG/DL (ref 70–110)
POCT GLUCOSE: 222 MG/DL (ref 70–110)
POIKILOCYTOSIS BLD QL SMEAR: SLIGHT
POLYCHROMASIA BLD QL SMEAR: ABNORMAL
POTASSIUM SERPL-SCNC: 4.3 MMOL/L
RBC # BLD AUTO: 2.39 M/UL
SODIUM SERPL-SCNC: 139 MMOL/L
SPHEROCYTES BLD QL SMEAR: ABNORMAL
WBC # BLD AUTO: 2 K/UL

## 2018-01-21 PROCEDURE — 99233 SBSQ HOSP IP/OBS HIGH 50: CPT | Mod: ,,, | Performed by: INTERNAL MEDICINE

## 2018-01-21 PROCEDURE — 25000242 PHARM REV CODE 250 ALT 637 W/ HCPCS: Performed by: NURSE PRACTITIONER

## 2018-01-21 PROCEDURE — 94640 AIRWAY INHALATION TREATMENT: CPT

## 2018-01-21 PROCEDURE — 25000003 PHARM REV CODE 250: Performed by: NURSE PRACTITIONER

## 2018-01-21 PROCEDURE — C9113 INJ PANTOPRAZOLE SODIUM, VIA: HCPCS | Performed by: EMERGENCY MEDICINE

## 2018-01-21 PROCEDURE — 63600175 PHARM REV CODE 636 W HCPCS: Performed by: INTERNAL MEDICINE

## 2018-01-21 PROCEDURE — 63600175 PHARM REV CODE 636 W HCPCS: Performed by: NURSE PRACTITIONER

## 2018-01-21 PROCEDURE — 94799 UNLISTED PULMONARY SVC/PX: CPT

## 2018-01-21 PROCEDURE — 85018 HEMOGLOBIN: CPT | Mod: 91

## 2018-01-21 PROCEDURE — 85014 HEMATOCRIT: CPT

## 2018-01-21 PROCEDURE — 21400001 HC TELEMETRY ROOM

## 2018-01-21 PROCEDURE — 85025 COMPLETE CBC W/AUTO DIFF WBC: CPT

## 2018-01-21 PROCEDURE — 80048 BASIC METABOLIC PNL TOTAL CA: CPT

## 2018-01-21 PROCEDURE — 36415 COLL VENOUS BLD VENIPUNCTURE: CPT

## 2018-01-21 PROCEDURE — 85014 HEMATOCRIT: CPT | Mod: 91

## 2018-01-21 PROCEDURE — 63600175 PHARM REV CODE 636 W HCPCS: Performed by: EMERGENCY MEDICINE

## 2018-01-21 PROCEDURE — 85018 HEMOGLOBIN: CPT

## 2018-01-21 PROCEDURE — 99232 SBSQ HOSP IP/OBS MODERATE 35: CPT | Mod: ,,, | Performed by: INTERNAL MEDICINE

## 2018-01-21 RX ORDER — CYANOCOBALAMIN 1000 UG/ML
1000 INJECTION, SOLUTION INTRAMUSCULAR; SUBCUTANEOUS DAILY
Status: DISCONTINUED | OUTPATIENT
Start: 2018-01-21 | End: 2018-01-23 | Stop reason: HOSPADM

## 2018-01-21 RX ADMIN — PANTOPRAZOLE SODIUM 40 MG: 40 INJECTION, POWDER, FOR SOLUTION INTRAVENOUS at 09:01

## 2018-01-21 RX ADMIN — IPRATROPIUM BROMIDE AND ALBUTEROL SULFATE 3 ML: .5; 3 SOLUTION RESPIRATORY (INHALATION) at 04:01

## 2018-01-21 RX ADMIN — HYDRALAZINE HYDROCHLORIDE 5 MG: 20 INJECTION, SOLUTION INTRAMUSCULAR; INTRAVENOUS at 05:01

## 2018-01-21 RX ADMIN — LISINOPRIL 10 MG: 10 TABLET ORAL at 08:01

## 2018-01-21 RX ADMIN — INSULIN ASPART 2 UNITS: 100 INJECTION, SOLUTION INTRAVENOUS; SUBCUTANEOUS at 11:01

## 2018-01-21 RX ADMIN — IPRATROPIUM BROMIDE AND ALBUTEROL SULFATE 3 ML: .5; 3 SOLUTION RESPIRATORY (INHALATION) at 12:01

## 2018-01-21 RX ADMIN — IPRATROPIUM BROMIDE AND ALBUTEROL SULFATE 3 ML: .5; 3 SOLUTION RESPIRATORY (INHALATION) at 09:01

## 2018-01-21 RX ADMIN — CYANOCOBALAMIN 1000 MCG: 1000 INJECTION, SOLUTION INTRAMUSCULAR; SUBCUTANEOUS at 11:01

## 2018-01-21 RX ADMIN — GUAIFENESIN 600 MG: 600 TABLET, EXTENDED RELEASE ORAL at 09:01

## 2018-01-21 RX ADMIN — GUAIFENESIN 600 MG: 600 TABLET, EXTENDED RELEASE ORAL at 08:01

## 2018-01-21 RX ADMIN — PANTOPRAZOLE SODIUM 40 MG: 40 INJECTION, POWDER, FOR SOLUTION INTRAVENOUS at 08:01

## 2018-01-21 NOTE — PROGRESS NOTES
[facility]   Hematology/Oncology  Progress Note    Patient Name: Viv Argueta  Admission Date: 1/19/2018  Hospital Length of Stay: 1 days  Code Status: Full Code     Subjective:     HPI:  67-year-old female transferred from [place] through the [facility].  For GI evaluation.  The patient was found to be pancytopenic I was asked see the patient for further evaluation.  Patient's history although limited by her ability to communicate with me stating that she was doing recently well until the last several days when she's been having increasing fatigue and weakness    Interval History: Patient appears to be resting comfortably denies any nausea vomiting daughter at bedside was able to discuss with her no history of hepatitis no history of primary liver disease acute hepatitis panel pending    Oncology Treatment Plan:   [No treatment plan]    Medications:  Continuous Infusions:   sodium chloride 0.9% 50 mL/hr at 01/20/18 0239     Scheduled Meds:   albuterol-ipratropium 2.5mg-0.5mg/3mL  3 mL Nebulization Q8H    cyanocobalamin  1,000 mcg Subcutaneous Daily    guaiFENesin  600 mg Oral BID    lisinopril  10 mg Oral Daily    pantoprazole  40 mg Intravenous BID     PRN Meds:sodium chloride, acetaminophen, dextrose 50%, dextrose 50%, dextrose 50%, glucagon (human recombinant), glucagon (human recombinant), glucose, glucose, hydrALAZINE, insulin aspart     Review of Systems   Constitutional: Positive for activity change and fatigue. Negative for appetite change, chills, diaphoresis, fever and unexpected weight change.   HENT: Negative for congestion, dental problem, drooling, ear discharge, ear pain, facial swelling, hearing loss, mouth sores, nosebleeds, postnasal drip, rhinorrhea, sinus pressure, sneezing, sore throat, tinnitus, trouble swallowing and voice change.    Eyes: Negative for photophobia, pain, discharge, redness, itching and visual disturbance.    Respiratory: Negative for cough, choking, chest tightness, shortness of breath, wheezing and stridor.    Cardiovascular: Negative for chest pain, palpitations and leg swelling.   Gastrointestinal: Negative for abdominal distention, abdominal pain, anal bleeding, blood in stool, constipation, diarrhea, nausea, rectal pain and vomiting.   Endocrine: Negative for cold intolerance, heat intolerance, polydipsia, polyphagia and polyuria.   Genitourinary: Negative for decreased urine volume, difficulty urinating, dyspareunia, dysuria, enuresis, flank pain, frequency, genital sores, hematuria, menstrual problem, pelvic pain, urgency, vaginal bleeding, vaginal discharge and vaginal pain.   Musculoskeletal: Negative for arthralgias, back pain, gait problem, joint swelling, myalgias, neck pain and neck stiffness.   Skin: Negative for color change, pallor and rash.   Allergic/Immunologic: Negative for environmental allergies, food allergies and immunocompromised state.   Neurological: Positive for weakness. Negative for dizziness, tremors, seizures, syncope, facial asymmetry, speech difficulty, light-headedness, numbness and headaches.   Hematological: Negative for adenopathy. Does not bruise/bleed easily.   Psychiatric/Behavioral: Positive for dysphoric mood. Negative for agitation, behavioral problems, confusion, decreased concentration, hallucinations, self-injury, sleep disturbance and suicidal ideas. The patient is nervous/anxious. The patient is not hyperactive.      Objective:     Vital Signs (Most Recent):  Temp: 98.1 °F (36.7 °C) (01/21/18 0740)  Pulse: 85 (01/21/18 0905)  Resp: 20 (01/21/18 0905)  BP: (!) 161/84 (01/21/18 0740)  SpO2: 97 % (01/21/18 0740) Vital Signs (24h Range):  Temp:  [97.6 °F (36.4 °C)-99.6 °F (37.6 °C)] 98.1 °F (36.7 °C)  Pulse:  [83-98] 85  Resp:  [18-20] 20  SpO2:  [93 %-97 %] 97 %  BP: (144-164)/(64-87) 161/84     Weight: 72 kg (158 lb 11.7 oz)  Body mass index is 27.25 kg/m².  Body surface  area is 1.8 meters squared.      Intake/Output Summary (Last 24 hours) at 01/21/18 0925  Last data filed at 01/21/18 0600   Gross per 24 hour   Intake             1670 ml   Output                0 ml   Net             1670 ml       Physical Exam   Constitutional: She is oriented to person, place, and time. She appears distressed.   HENT:   Head: Normocephalic and atraumatic.   Right Ear: External ear normal.   Left Ear: External ear normal.   Nose: Nose normal. Right sinus exhibits no maxillary sinus tenderness and no frontal sinus tenderness. Left sinus exhibits no maxillary sinus tenderness and no frontal sinus tenderness.   Mouth/Throat: Oropharynx is clear and moist. No oropharyngeal exudate.   Eyes: Conjunctivae, EOM and lids are normal. Pupils are equal, round, and reactive to light. Right eye exhibits no discharge. Left eye exhibits no discharge. Right conjunctiva is not injected. Right conjunctiva has no hemorrhage. Left conjunctiva is not injected. Left conjunctiva has no hemorrhage. No scleral icterus.   Neck: Normal range of motion. Neck supple. No JVD present. No tracheal deviation present. No thyromegaly present.   Cardiovascular: Normal rate and regular rhythm.    Pulmonary/Chest: Effort normal and breath sounds normal. No stridor. No respiratory distress. She exhibits no tenderness.   Abdominal: Soft. Bowel sounds are normal. She exhibits no distension and no mass. There is no splenomegaly or hepatomegaly. There is no tenderness. There is no rebound.   Musculoskeletal: Normal range of motion. She exhibits no edema or tenderness.   Lymphadenopathy:     She has no cervical adenopathy.     She has no axillary adenopathy.        Right: No supraclavicular adenopathy present.        Left: No supraclavicular adenopathy present.   Neurological: She is alert and oriented to person, place, and time. No cranial nerve deficit. Coordination normal.   Skin: Skin is dry. No rash noted. She is not diaphoretic. No  erythema.   Psychiatric: She has a normal mood and affect. Her behavior is normal. Judgment and thought content normal.   Vitals reviewed.      Significant Labs:   BMP:   Recent Labs  Lab 01/20/18  0007 01/20/18  0433 01/21/18  0610   * 117*  117* 114*    138  138 139   K 4.4 4.3  4.3 4.3    110  110 110   CO2 19* 20*  20* 22*   BUN 17 15  15 13   CREATININE 1.0 0.9  0.9 0.9   CALCIUM 8.6* 8.6*  8.6* 8.8   , CBC:   Recent Labs  Lab 01/20/18  0007 01/20/18  0433  01/20/18  1800 01/21/18  0031 01/21/18  0610   WBC 2.32* 2.21*  2.21*  --   --   --  2.00*   HGB 7.6* 7.1*  7.1*  7.1*  < > 8.6* 7.6* 8.2*   HCT 22.0* 20.8*  20.8*  20.8*  < > 25.3* 21.8* 23.6*   * 116*  116*  --   --   --  105*   < > = values in this interval not displayed. and CMP:   Recent Labs  Lab 01/20/18  0007 01/20/18  0433 01/21/18  0610    138  138 139   K 4.4 4.3  4.3 4.3    110  110 110   CO2 19* 20*  20* 22*   * 117*  117* 114*   BUN 17 15  15 13   CREATININE 1.0 0.9  0.9 0.9   CALCIUM 8.6* 8.6*  8.6* 8.8   PROT 7.3  --   --    ALBUMIN 3.5  --   --    BILITOT 2.0*  --   --    ALKPHOS 64  --   --    AST 29  --   --    ALT 10  --   --    ANIONGAP 9 8  8 7*   EGFRNONAA 58* >60  >60 >60       Diagnostic Results:  I have reviewed and interpreted all pertinent imaging results/findings within the past 24 hours.    Assessment/Plan:     Pancytopenia    The patient appears to be pancytopenic review of her peripheral smear reveals no marked evidence of abnormal peripheral blood count.  Markedly elevated LDH suggests either primary liver disease or hemolysis will check reticulocyte count and haptoglobin and direct Josy.  Continue to follow.  1/21/18 extensive conversation with the daughter discussed the diagnosis of pancytopenia at this point awaiting hepatitis panel in addition I've ordered a limited ultrasound today to assess splenic size on physical exam I do not feel an enlarged  spleen.  My recommendations are that the patient be considered for a bone marrow aspirate and biopsy I have followed it to the daughter article from up-to-date on pancytopenia delineated workup the patient does not have active hemolysis at the present time although her haptoglobin level is at the low end of normal.  At this time my recommendations are that if ultrasound demonstrates normal splenic size at would proceed with a bone marrow aspirate and biopsy to determine the cause pancytopenia such as primary myeloproliferative disorder such as myelofibrosis or myelo dysplasia            Thank you for your consult. I will follow-up with patient. Please contact us if you have any additional questions.     David Abdi MD  Hematology/Oncology  [facility]

## 2018-01-21 NOTE — PROGRESS NOTES
"[facility] Hospital Medicine  Progress Note    Patient Name: Viv Argueta  MRN: [id number]  Patient Class: IP- Inpatient   Admission Date: 1/19/2018  Length of Stay: 1 days  Attending Physician: Kwaku Taylor MD  Primary Care Provider: Primary Doctor No        Subjective:     Principal Problem:Acute blood loss anemia    HPI:  Viv Argueta is a 67 y.o. female patient  is transfer from [facility] for GI services. The patient reports she presented to the ER with complaints of "feeling bad, weak." The patient reports onset "a few days ago." Reports recently around her family member who diagnosed with the Flu ( and son). Documentation sent with patient notes pt was seen in clinic 1/19/2017 prior to ER at [place]-->Flu neg, H/H 4.5/13.5 WBC 2.2. Was diagnosed with acute bronchitis, anemia and leukoctopenia was sent to [place] ER. Further eval in [place] noted +Occult Stool, was tranfused 2 units PRBC, given and 40 IV Protonix. No exacerbating factors reported. Associated sxs include non productive cough. Patient was started on course of Cipro and Pyridim 1/11/18 for UTI. Patient denies fever, chills, congestion, sob, cp, abd pain,  or GI symptoms, and all other sxs at this time. No further complaints or concerns at this time. The patient admitted for pancytopenia, acute blood loss anemia from suspected GI bleed. Labs collected on arrival to [facility] consistent with pancytopenia, AST/ALT normal, LD elevated. Rapid HIV neg. Will further eval with anemia labs, Acute Hep panel given LD elevation and pancytopenia. Also Check Urine as well as ETOH and Drugs-->notes occasional drinker. Check Chest Xray-->independent soft read vascular congestion and ?left low consilidation. Will cover rocephin and azithromycin and check blood cultures    Hospital Course:  On 1/19 patient was admitted to [facility] for Acute Blood Loss Anemia, Pancytopenia, and presumed GI Bleed with " Occult + stool.  Upon admit here her Hgb was 7.6 after receiving 2 units of PRBCs prior to transferring here.  Hemoc and GI were consulted.  Patient is primarily Haitian speaking requiring an interpretor.  Per Hemoc, her markedly elevated LDH suggests either primary liver disease or hemolysis.  Reticulocyte count, haptoglobin and direct Josy pending.  Per GI she would benefit from Endoscopy given she has never had a routine Colonoscopy, but doubtful that Anemia is r/t GI source.  Hgb decreased to 6.9 on 1/20, to receive 1 unit of PRBCs.  Continue Hgb/Hct q 6 hours and transfuse to keep Hgb >7.0.  CXR on admit showed mild cardiomegaly with mild pulmonary edema.  BC pending.  She has remained afebrile and has no evidence of PNA, therefore Azithromycin and Rocephin dc'd.  She was recently treated for a UTI.  Repeat UA here negative for infectious process.  UC pending.  US of spleen on 1/21 showed a homogeneous non-enlarged spleen at 11.8 cm.  Pathologist consulted per Hemoc for bone marrow biopsy.  Son at  today reported his biological son was diagnosed with Dyskeratosis Congenita at the age of 6 and required a bone marrow transplant at [facility].  Son denies known h/o other family members with this diagnosis. His wife has 2 children from a prior marriage that do not have this disorder.  Hgb stable at 8.2 s/p 1 unit PRBCs on 1/20.  Platelets stable at 105. B12 <146, started on daily supplementation.    Interval History:  US of spleen on 1/21 showed a homogeneous non-enlarged spleen at 11.8 cm.  Pathologist consulted per [facility] for bone marrow biopsy.  Son at  today reported his biological son was diagnosed with Dyskeratosis Congenita at the age of 6 and required a bone marrow transplant at [facility].  Son denies known h/o other family members with this diagnosis. His wife has 2 children from a prior marriage that do not have this disorder.  Hgb stable at 8.2 s/p 1 unit PRBCs on 1/20.  Platelets stable at 105. B12  <146, started on daily supplementation.    Review of Systems   Constitutional: Positive for fatigue. Negative for chills, diaphoresis and fever.   HENT: Negative for facial swelling, hearing loss, mouth sores, sneezing, sore throat, tinnitus and trouble swallowing.    Eyes: Negative for photophobia, pain, discharge, redness and visual disturbance.   Respiratory: Negative for apnea, cough, choking, chest tightness, shortness of breath, wheezing and stridor.    Cardiovascular: Negative for chest pain, palpitations and leg swelling.   Gastrointestinal: Negative for abdominal distention, abdominal pain, anal bleeding, blood in stool, constipation, diarrhea, nausea, rectal pain and vomiting.   Endocrine: Negative for cold intolerance, heat intolerance, polydipsia, polyphagia and polyuria.   Genitourinary: Negative for difficulty urinating, dysuria, flank pain, frequency, hematuria, pelvic pain, urgency, vaginal bleeding, vaginal discharge and vaginal pain.   Musculoskeletal: Negative for arthralgias, back pain, gait problem, myalgias and neck stiffness.   Skin: Negative for pallor, rash and wound.   Allergic/Immunologic: Negative for food allergies.   Neurological: Positive for weakness. Negative for dizziness, tremors, seizures, syncope, speech difficulty and headaches.   Hematological: Negative for adenopathy. Does not bruise/bleed easily.   Psychiatric/Behavioral: Negative for behavioral problems and confusion. The patient is not nervous/anxious.    All other systems reviewed and are negative.    Objective:     Vital Signs (Most Recent):  Temp: 98.7 °F (37.1 °C) (01/21/18 1140)  Pulse: 92 (01/21/18 1140)  Resp: 18 (01/21/18 1140)  BP: (!) 150/68 (01/21/18 1140)  SpO2: 96 % (01/21/18 1140) Vital Signs (24h Range):  Temp:  [98 °F (36.7 °C)-99.6 °F (37.6 °C)] 98.7 °F (37.1 °C)  Pulse:  [83-98] 92  Resp:  [18-20] 18  SpO2:  [93 %-97 %] 96 %  BP: (144-161)/(64-87) 150/68     Weight: 72 kg (158 lb 11.7 oz)  Body mass index  is 27.25 kg/m².    Intake/Output Summary (Last 24 hours) at 01/21/18 1434  Last data filed at 01/21/18 0600   Gross per 24 hour   Intake             1670 ml   Output                0 ml   Net             1670 ml      Physical Exam   Constitutional: She is oriented to person, place, and time. She appears well-developed and well-nourished.   HENT:   Head: Normocephalic and atraumatic.   Mouth/Throat: Oropharynx is clear and moist.   Eyes: Conjunctivae and EOM are normal. Pupils are equal, round, and reactive to light.   Neck: Normal range of motion. Neck supple.   Cardiovascular: Normal rate, regular rhythm, normal heart sounds and intact distal pulses.  Exam reveals no gallop and no friction rub.    No murmur heard.  Pulmonary/Chest: Effort normal and breath sounds normal. No respiratory distress. She has no wheezes. She has no rales. She exhibits no tenderness.   Abdominal: Soft. Bowel sounds are normal. She exhibits no distension and no mass. There is no tenderness.   Musculoskeletal: Normal range of motion. She exhibits no edema or tenderness.   Neurological: She is alert and oriented to person, place, and time.   Skin: Skin is warm and dry. No rash noted. No erythema.   Psychiatric: She has a normal mood and affect. Her behavior is normal. Judgment and thought content normal.   Nursing note and vitals reviewed.      Significant Labs:   BMP:   Recent Labs  Lab 01/21/18  0610   *      K 4.3      CO2 22*   BUN 13   CREATININE 0.9   CALCIUM 8.8     CBC:   Recent Labs  Lab 01/20/18  0007 01/20/18  0433  01/20/18  1800 01/21/18  0031 01/21/18  0610   WBC 2.32* 2.21*  2.21*  --   --   --  2.00*   HGB 7.6* 7.1*  7.1*  7.1*  < > 8.6* 7.6* 8.2*   HCT 22.0* 20.8*  20.8*  20.8*  < > 25.3* 21.8* 23.6*   * 116*  116*  --   --   --  105*   < > = values in this interval not displayed.    Significant Imaging: I have reviewed all pertinent imaging results/findings within the past 24  hours.    Assessment/Plan:      * Acute blood loss anemia    - Hemoc and GI consulted  - Could be r/t Hemolysis or Primary Liver Dx given elevated LDH; per GI, doubtful source is GI in nature  - Hgb improved to 8.2 s/p 1unit PRBCs on 1/20  - Platelets stable at 105  - Continue H/H q 8 hours  - Transfuse as needed to keep Hgb >7.0  - Retic count 5.2  - Josy negative  - Haptoglobin 30  - US of spleen on 1/21 showed a homogeneous non-enlarged spleen at 11.8 cm.    - Pathologist consulted per Hemoc for bone marrow biopsy.    - Son at  today reported his biological son was diagnosed with Dyskeratosis Congenita at the age of 6 and required a bone marrow transplant at [facility].    - B12 <146, started on daily supplementation  - Monitor  - Supportive care        Pancytopenia    - Hemoc consulted  - See above  - Daily CBC  - Monitor          GI bleed    - OCB +, but denies any active bleeding and melena since admit  - Evaluated by GI  - Per GI, would benefit from Endoscopy since she has never had any prior screenings  - CL diet   - Continue PPI  - Hgb/Hct q 8 hours  - Transfuse as needed to keep Hgb >7.0  - Monitor        DM (diabetes mellitus)    - A1c 5.3  - Accu checks with SSI PRN  - Hold home Amaryl and Metformin for now  - ADA diet  - Monitor        Hypertension    - BP under fair control  - Continue home Lisinopril  - Monitor          VTE Risk Mitigation         Ordered     Medium Risk of VTE  Once      01/20/18 0102     Place ABHINAV hose  Until discontinued      01/20/18 0102     Place sequential compression device  Until discontinued      01/20/18 0102     Reason for No Pharmacological VTE Prophylaxis  Once     Hold AC given Acute blood loss Anemia and Thrombocytopenia. 01/20/18 0102              Joseline Perales, TAWANA, ACNP-BC  Department of Hospital Medicine   [facility]

## 2018-01-21 NOTE — ASSESSMENT & PLAN NOTE
The patient appears to be pancytopenic review of her peripheral smear reveals no marked evidence of abnormal peripheral blood count.  Markedly elevated LDH suggests either primary liver disease or hemolysis will check reticulocyte count and haptoglobin and direct Josy.  Continue to follow.  1/21/18 extensive conversation with the daughter discussed the diagnosis of pancytopenia at this point awaiting hepatitis panel in addition I've ordered a limited ultrasound today to assess splenic size on physical exam I do not feel an enlarged spleen.  My recommendations are that the patient be considered for a bone marrow aspirate and biopsy I have followed it to the daughter article from up-to-date on pancytopenia delineated workup the patient does not have active hemolysis at the present time although her haptoglobin level is at the low end of normal.  At this time my recommendations are that if ultrasound demonstrates normal splenic size at would proceed with a bone marrow aspirate and biopsy to determine the cause pancytopenia such as primary myeloproliferative disorder such as myelofibrosis or myelo dysplasia

## 2018-01-21 NOTE — PLAN OF CARE
Problem: Patient Care Overview  Goal: Plan of Care Review  Outcome: Ongoing (interventions implemented as appropriate)  Pt free of falls during shift. VSS.  PIV intact and infusing NS as ordered. SR on tele monitor. Accuchecks completed as ordered and insulin given per SS. Abdominal US done during shift. Pt on room air, no SOB noted. Call light in reach, hourly rounding made, family at bedside, will continue to monitor.

## 2018-01-21 NOTE — ASSESSMENT & PLAN NOTE
- OCB +, but denies any active bleeding and melena since admit  - Evaluated by GI  - Per GI, would benefit from Endoscopy since she has never had any prior screenings  - CL diet   - Continue PPI  - Hgb/Hct q 8 hours  - Transfuse as needed to keep Hgb >7.0  - Monitor

## 2018-01-21 NOTE — PLAN OF CARE
Problem: Patient Care Overview  Goal: Plan of Care Review  Outcome: Ongoing (interventions implemented as appropriate)  Pt free of falls during shift. VSS.  PIV intact and IVF infusing per orders. Pt received one unit of blood during shift. Accuchecks completed during shift as ordered, no insulin required. Pt clear liquid diet at midnight. Pt on room air, no SOB noted. Call light in reach, hourly rounding made, will continue to monitor.

## 2018-01-21 NOTE — ASSESSMENT & PLAN NOTE
- A1c 5.3  - Accu checks with SSI PRN  - Hold home Amaryl and Metformin for now  - ADA diet  - Monitor

## 2018-01-21 NOTE — ASSESSMENT & PLAN NOTE
- Hemoc and GI consulted  - Could be r/t Hemolysis or Primary Liver Dx given elevated LDH; per GI, doubtful source is GI in nature  - Hgb improved to 8.2 s/p 1unit PRBCs on 1/20  - Platelets stable at 105  - Continue H/H q 8 hours  - Transfuse as needed to keep Hgb >7.0  - Retic count 5.2  - Josy negative  - Haptoglobin 30  - US of spleen on 1/21 showed a homogeneous non-enlarged spleen at 11.8 cm.    - Pathologist consulted per Hemoc for bone marrow biopsy.    - Son at  today reported his biological son was diagnosed with Dyskeratosis Congenita at the age of 6 and required a bone marrow transplant at [facility].    - B12 <146, started on daily supplementation  - Monitor  - Supportive care

## 2018-01-21 NOTE — SUBJECTIVE & OBJECTIVE
Interval History: Patient appears to be resting comfortably denies any nausea vomiting daughter at bedside was able to discuss with her no history of hepatitis no history of primary liver disease acute hepatitis panel pending    Oncology Treatment Plan:   [No treatment plan]    Medications:  Continuous Infusions:   sodium chloride 0.9% 50 mL/hr at 01/20/18 0239     Scheduled Meds:   albuterol-ipratropium 2.5mg-0.5mg/3mL  3 mL Nebulization Q8H    cyanocobalamin  1,000 mcg Subcutaneous Daily    guaiFENesin  600 mg Oral BID    lisinopril  10 mg Oral Daily    pantoprazole  40 mg Intravenous BID     PRN Meds:sodium chloride, acetaminophen, dextrose 50%, dextrose 50%, dextrose 50%, glucagon (human recombinant), glucagon (human recombinant), glucose, glucose, hydrALAZINE, insulin aspart     Review of Systems   Constitutional: Positive for activity change and fatigue. Negative for appetite change, chills, diaphoresis, fever and unexpected weight change.   HENT: Negative for congestion, dental problem, drooling, ear discharge, ear pain, facial swelling, hearing loss, mouth sores, nosebleeds, postnasal drip, rhinorrhea, sinus pressure, sneezing, sore throat, tinnitus, trouble swallowing and voice change.    Eyes: Negative for photophobia, pain, discharge, redness, itching and visual disturbance.   Respiratory: Negative for cough, choking, chest tightness, shortness of breath, wheezing and stridor.    Cardiovascular: Negative for chest pain, palpitations and leg swelling.   Gastrointestinal: Negative for abdominal distention, abdominal pain, anal bleeding, blood in stool, constipation, diarrhea, nausea, rectal pain and vomiting.   Endocrine: Negative for cold intolerance, heat intolerance, polydipsia, polyphagia and polyuria.   Genitourinary: Negative for decreased urine volume, difficulty urinating, dyspareunia, dysuria, enuresis, flank pain, frequency, genital sores, hematuria, menstrual problem, pelvic pain, urgency,  vaginal bleeding, vaginal discharge and vaginal pain.   Musculoskeletal: Negative for arthralgias, back pain, gait problem, joint swelling, myalgias, neck pain and neck stiffness.   Skin: Negative for color change, pallor and rash.   Allergic/Immunologic: Negative for environmental allergies, food allergies and immunocompromised state.   Neurological: Positive for weakness. Negative for dizziness, tremors, seizures, syncope, facial asymmetry, speech difficulty, light-headedness, numbness and headaches.   Hematological: Negative for adenopathy. Does not bruise/bleed easily.   Psychiatric/Behavioral: Positive for dysphoric mood. Negative for agitation, behavioral problems, confusion, decreased concentration, hallucinations, self-injury, sleep disturbance and suicidal ideas. The patient is nervous/anxious. The patient is not hyperactive.      Objective:     Vital Signs (Most Recent):  Temp: 98.1 °F (36.7 °C) (01/21/18 0740)  Pulse: 85 (01/21/18 0905)  Resp: 20 (01/21/18 0905)  BP: (!) 161/84 (01/21/18 0740)  SpO2: 97 % (01/21/18 0740) Vital Signs (24h Range):  Temp:  [97.6 °F (36.4 °C)-99.6 °F (37.6 °C)] 98.1 °F (36.7 °C)  Pulse:  [83-98] 85  Resp:  [18-20] 20  SpO2:  [93 %-97 %] 97 %  BP: (144-164)/(64-87) 161/84     Weight: 72 kg (158 lb 11.7 oz)  Body mass index is 27.25 kg/m².  Body surface area is 1.8 meters squared.      Intake/Output Summary (Last 24 hours) at 01/21/18 0925  Last data filed at 01/21/18 0600   Gross per 24 hour   Intake             1670 ml   Output                0 ml   Net             1670 ml       Physical Exam   Constitutional: She is oriented to person, place, and time. She appears distressed.   HENT:   Head: Normocephalic and atraumatic.   Right Ear: External ear normal.   Left Ear: External ear normal.   Nose: Nose normal. Right sinus exhibits no maxillary sinus tenderness and no frontal sinus tenderness. Left sinus exhibits no maxillary sinus tenderness and no frontal sinus tenderness.    Mouth/Throat: Oropharynx is clear and moist. No oropharyngeal exudate.   Eyes: Conjunctivae, EOM and lids are normal. Pupils are equal, round, and reactive to light. Right eye exhibits no discharge. Left eye exhibits no discharge. Right conjunctiva is not injected. Right conjunctiva has no hemorrhage. Left conjunctiva is not injected. Left conjunctiva has no hemorrhage. No scleral icterus.   Neck: Normal range of motion. Neck supple. No JVD present. No tracheal deviation present. No thyromegaly present.   Cardiovascular: Normal rate and regular rhythm.    Pulmonary/Chest: Effort normal and breath sounds normal. No stridor. No respiratory distress. She exhibits no tenderness.   Abdominal: Soft. Bowel sounds are normal. She exhibits no distension and no mass. There is no splenomegaly or hepatomegaly. There is no tenderness. There is no rebound.   Musculoskeletal: Normal range of motion. She exhibits no edema or tenderness.   Lymphadenopathy:     She has no cervical adenopathy.     She has no axillary adenopathy.        Right: No supraclavicular adenopathy present.        Left: No supraclavicular adenopathy present.   Neurological: She is alert and oriented to person, place, and time. No cranial nerve deficit. Coordination normal.   Skin: Skin is dry. No rash noted. She is not diaphoretic. No erythema.   Psychiatric: She has a normal mood and affect. Her behavior is normal. Judgment and thought content normal.   Vitals reviewed.      Significant Labs:   BMP:   Recent Labs  Lab 01/20/18  0007 01/20/18  0433 01/21/18  0610   * 117*  117* 114*    138  138 139   K 4.4 4.3  4.3 4.3    110  110 110   CO2 19* 20*  20* 22*   BUN 17 15  15 13   CREATININE 1.0 0.9  0.9 0.9   CALCIUM 8.6* 8.6*  8.6* 8.8   , CBC:   Recent Labs  Lab 01/20/18  0007 01/20/18  0433  01/20/18  1800 01/21/18  0031 01/21/18  0610   WBC 2.32* 2.21*  2.21*  --   --   --  2.00*   HGB 7.6* 7.1*  7.1*  7.1*  < > 8.6* 7.6* 8.2*    HCT 22.0* 20.8*  20.8*  20.8*  < > 25.3* 21.8* 23.6*   * 116*  116*  --   --   --  105*   < > = values in this interval not displayed. and CMP:   Recent Labs  Lab 01/20/18  0007 01/20/18  0433 01/21/18  0610    138  138 139   K 4.4 4.3  4.3 4.3    110  110 110   CO2 19* 20*  20* 22*   * 117*  117* 114*   BUN 17 15  15 13   CREATININE 1.0 0.9  0.9 0.9   CALCIUM 8.6* 8.6*  8.6* 8.8   PROT 7.3  --   --    ALBUMIN 3.5  --   --    BILITOT 2.0*  --   --    ALKPHOS 64  --   --    AST 29  --   --    ALT 10  --   --    ANIONGAP 9 8  8 7*   EGFRNONAA 58* >60  >60 >60       Diagnostic Results:  I have reviewed and interpreted all pertinent imaging results/findings within the past 24 hours.

## 2018-01-21 NOTE — SUBJECTIVE & OBJECTIVE
Interval History:  US of spleen on 1/21 showed a homogeneous non-enlarged spleen at 11.8 cm.  Pathologist consulted per Hemoc for bone marrow biopsy.  Son at  today reported his biological son was diagnosed with Dyskeratosis Congenita at the age of 6 and required a bone marrow transplant at [facility].  Son denies known h/o other family members with this diagnosis. His wife has 2 children from a prior marriage that do not have this disorder.  Hgb stable at 8.2 s/p 1 unit PRBCs on 1/20.  Platelets stable at 105. B12 <146, started on daily supplementation.    Review of Systems   Constitutional: Positive for fatigue. Negative for chills, diaphoresis and fever.   HENT: Negative for facial swelling, hearing loss, mouth sores, sneezing, sore throat, tinnitus and trouble swallowing.    Eyes: Negative for photophobia, pain, discharge, redness and visual disturbance.   Respiratory: Negative for apnea, cough, choking, chest tightness, shortness of breath, wheezing and stridor.    Cardiovascular: Negative for chest pain, palpitations and leg swelling.   Gastrointestinal: Negative for abdominal distention, abdominal pain, anal bleeding, blood in stool, constipation, diarrhea, nausea, rectal pain and vomiting.   Endocrine: Negative for cold intolerance, heat intolerance, polydipsia, polyphagia and polyuria.   Genitourinary: Negative for difficulty urinating, dysuria, flank pain, frequency, hematuria, pelvic pain, urgency, vaginal bleeding, vaginal discharge and vaginal pain.   Musculoskeletal: Negative for arthralgias, back pain, gait problem, myalgias and neck stiffness.   Skin: Negative for pallor, rash and wound.   Allergic/Immunologic: Negative for food allergies.   Neurological: Positive for weakness. Negative for dizziness, tremors, seizures, syncope, speech difficulty and headaches.   Hematological: Negative for adenopathy. Does not bruise/bleed easily.   Psychiatric/Behavioral: Negative for behavioral problems and  confusion. The patient is not nervous/anxious.    All other systems reviewed and are negative.    Objective:     Vital Signs (Most Recent):  Temp: 98.7 °F (37.1 °C) (01/21/18 1140)  Pulse: 92 (01/21/18 1140)  Resp: 18 (01/21/18 1140)  BP: (!) 150/68 (01/21/18 1140)  SpO2: 96 % (01/21/18 1140) Vital Signs (24h Range):  Temp:  [98 °F (36.7 °C)-99.6 °F (37.6 °C)] 98.7 °F (37.1 °C)  Pulse:  [83-98] 92  Resp:  [18-20] 18  SpO2:  [93 %-97 %] 96 %  BP: (144-161)/(64-87) 150/68     Weight: 72 kg (158 lb 11.7 oz)  Body mass index is 27.25 kg/m².    Intake/Output Summary (Last 24 hours) at 01/21/18 1434  Last data filed at 01/21/18 0600   Gross per 24 hour   Intake             1670 ml   Output                0 ml   Net             1670 ml      Physical Exam   Constitutional: She is oriented to person, place, and time. She appears well-developed and well-nourished.   HENT:   Head: Normocephalic and atraumatic.   Mouth/Throat: Oropharynx is clear and moist.   Eyes: Conjunctivae and EOM are normal. Pupils are equal, round, and reactive to light.   Neck: Normal range of motion. Neck supple.   Cardiovascular: Normal rate, regular rhythm, normal heart sounds and intact distal pulses.  Exam reveals no gallop and no friction rub.    No murmur heard.  Pulmonary/Chest: Effort normal and breath sounds normal. No respiratory distress. She has no wheezes. She has no rales. She exhibits no tenderness.   Abdominal: Soft. Bowel sounds are normal. She exhibits no distension and no mass. There is no tenderness.   Musculoskeletal: Normal range of motion. She exhibits no edema or tenderness.   Neurological: She is alert and oriented to person, place, and time.   Skin: Skin is warm and dry. No rash noted. No erythema.   Psychiatric: She has a normal mood and affect. Her behavior is normal. Judgment and thought content normal.   Nursing note and vitals reviewed.      Significant Labs:   BMP:   Recent Labs  Lab 01/21/18  0610   *       K 4.3      CO2 22*   BUN 13   CREATININE 0.9   CALCIUM 8.8     CBC:   Recent Labs  Lab 01/20/18  0007 01/20/18  0433  01/20/18  1800 01/21/18  0031 01/21/18  0610   WBC 2.32* 2.21*  2.21*  --   --   --  2.00*   HGB 7.6* 7.1*  7.1*  7.1*  < > 8.6* 7.6* 8.2*   HCT 22.0* 20.8*  20.8*  20.8*  < > 25.3* 21.8* 23.6*   * 116*  116*  --   --   --  105*   < > = values in this interval not displayed.    Significant Imaging: I have reviewed all pertinent imaging results/findings within the past 24 hours.

## 2018-01-22 LAB
ANION GAP SERPL CALC-SCNC: 6 MMOL/L
ANISOCYTOSIS BLD QL SMEAR: ABNORMAL
BASOPHILS # BLD AUTO: ABNORMAL K/UL
BASOPHILS NFR BLD: 0 %
BUN SERPL-MCNC: 8 MG/DL
CALCIUM SERPL-MCNC: 8.7 MG/DL
CHLORIDE SERPL-SCNC: 109 MMOL/L
CO2 SERPL-SCNC: 23 MMOL/L
CREAT SERPL-MCNC: 0.8 MG/DL
DACRYOCYTES BLD QL SMEAR: ABNORMAL
DIFFERENTIAL METHOD: ABNORMAL
EOSINOPHIL # BLD AUTO: ABNORMAL K/UL
EOSINOPHIL NFR BLD: 0 %
ERYTHROCYTE [DISTWIDTH] IN BLOOD BY AUTOMATED COUNT: 29.8 %
EST. GFR  (AFRICAN AMERICAN): >60 ML/MIN/1.73 M^2
EST. GFR  (NON AFRICAN AMERICAN): >60 ML/MIN/1.73 M^2
GLUCOSE SERPL-MCNC: 123 MG/DL
HAV IGM SERPL QL IA: NEGATIVE
HBV CORE IGM SERPL QL IA: NEGATIVE
HBV SURFACE AG SERPL QL IA: NEGATIVE
HCT VFR BLD AUTO: 22.9 %
HCT VFR BLD AUTO: 24 %
HCV AB SERPL QL IA: NEGATIVE
HGB BLD-MCNC: 7.9 G/DL
HGB BLD-MCNC: 8.2 G/DL
LYMPHOCYTES # BLD AUTO: ABNORMAL K/UL
LYMPHOCYTES NFR BLD: 47 %
MCH RBC QN AUTO: 34.5 PG
MCHC RBC AUTO-ENTMCNC: 34.5 G/DL
MCV RBC AUTO: 100 FL
MONOCYTES # BLD AUTO: ABNORMAL K/UL
MONOCYTES NFR BLD: 10 %
NEUTROPHILS NFR BLD: 43 %
OVALOCYTES BLD QL SMEAR: ABNORMAL
PLATELET # BLD AUTO: 106 K/UL
PLATELET BLD QL SMEAR: ABNORMAL
PMV BLD AUTO: 10.3 FL
POCT GLUCOSE: 116 MG/DL (ref 70–110)
POCT GLUCOSE: 135 MG/DL (ref 70–110)
POCT GLUCOSE: 186 MG/DL (ref 70–110)
POCT GLUCOSE: 316 MG/DL (ref 70–110)
POIKILOCYTOSIS BLD QL SMEAR: SLIGHT
POLYCHROMASIA BLD QL SMEAR: ABNORMAL
POTASSIUM SERPL-SCNC: 4.3 MMOL/L
RBC # BLD AUTO: 2.29 M/UL
SODIUM SERPL-SCNC: 138 MMOL/L
SPHEROCYTES BLD QL SMEAR: ABNORMAL
WBC # BLD AUTO: 1.81 K/UL

## 2018-01-22 PROCEDURE — 25000003 PHARM REV CODE 250: Performed by: PHYSICIAN ASSISTANT

## 2018-01-22 PROCEDURE — 25000003 PHARM REV CODE 250: Performed by: NURSE PRACTITIONER

## 2018-01-22 PROCEDURE — 99232 SBSQ HOSP IP/OBS MODERATE 35: CPT | Mod: ,,, | Performed by: INTERNAL MEDICINE

## 2018-01-22 PROCEDURE — 85014 HEMATOCRIT: CPT

## 2018-01-22 PROCEDURE — 63600175 PHARM REV CODE 636 W HCPCS: Performed by: INTERNAL MEDICINE

## 2018-01-22 PROCEDURE — 21400001 HC TELEMETRY ROOM

## 2018-01-22 PROCEDURE — 99900035 HC TECH TIME PER 15 MIN (STAT)

## 2018-01-22 PROCEDURE — 85007 BL SMEAR W/DIFF WBC COUNT: CPT

## 2018-01-22 PROCEDURE — 85027 COMPLETE CBC AUTOMATED: CPT

## 2018-01-22 PROCEDURE — 63600175 PHARM REV CODE 636 W HCPCS: Performed by: EMERGENCY MEDICINE

## 2018-01-22 PROCEDURE — 36415 COLL VENOUS BLD VENIPUNCTURE: CPT

## 2018-01-22 PROCEDURE — 94799 UNLISTED PULMONARY SVC/PX: CPT

## 2018-01-22 PROCEDURE — 25000242 PHARM REV CODE 250 ALT 637 W/ HCPCS: Performed by: NURSE PRACTITIONER

## 2018-01-22 PROCEDURE — 85018 HEMOGLOBIN: CPT

## 2018-01-22 PROCEDURE — 80048 BASIC METABOLIC PNL TOTAL CA: CPT

## 2018-01-22 PROCEDURE — 96372 THER/PROPH/DIAG INJ SC/IM: CPT

## 2018-01-22 PROCEDURE — 94640 AIRWAY INHALATION TREATMENT: CPT

## 2018-01-22 PROCEDURE — C9113 INJ PANTOPRAZOLE SODIUM, VIA: HCPCS | Performed by: EMERGENCY MEDICINE

## 2018-01-22 RX ORDER — AMLODIPINE BESYLATE 5 MG/1
5 TABLET ORAL DAILY
Status: DISCONTINUED | OUTPATIENT
Start: 2018-01-22 | End: 2018-01-23 | Stop reason: HOSPADM

## 2018-01-22 RX ORDER — POLYETHYLENE GLYCOL 3350, SODIUM SULFATE ANHYDROUS, SODIUM BICARBONATE, SODIUM CHLORIDE, POTASSIUM CHLORIDE 236; 22.74; 6.74; 5.86; 2.97 G/4L; G/4L; G/4L; G/4L; G/4L
4000 POWDER, FOR SOLUTION ORAL ONCE
Status: DISCONTINUED | OUTPATIENT
Start: 2018-01-22 | End: 2018-01-22

## 2018-01-22 RX ORDER — POLYETHYLENE GLYCOL 3350, SODIUM SULFATE ANHYDROUS, SODIUM BICARBONATE, SODIUM CHLORIDE, POTASSIUM CHLORIDE 236; 22.74; 6.74; 5.86; 2.97 G/4L; G/4L; G/4L; G/4L; G/4L
4000 POWDER, FOR SOLUTION ORAL ONCE
Status: COMPLETED | OUTPATIENT
Start: 2018-01-22 | End: 2018-01-22

## 2018-01-22 RX ADMIN — LISINOPRIL 10 MG: 10 TABLET ORAL at 09:01

## 2018-01-22 RX ADMIN — IPRATROPIUM BROMIDE AND ALBUTEROL SULFATE 3 ML: .5; 3 SOLUTION RESPIRATORY (INHALATION) at 04:01

## 2018-01-22 RX ADMIN — GUAIFENESIN 600 MG: 600 TABLET, EXTENDED RELEASE ORAL at 09:01

## 2018-01-22 RX ADMIN — PANTOPRAZOLE SODIUM 40 MG: 40 INJECTION, POWDER, FOR SOLUTION INTRAVENOUS at 08:01

## 2018-01-22 RX ADMIN — IPRATROPIUM BROMIDE AND ALBUTEROL SULFATE 3 ML: .5; 3 SOLUTION RESPIRATORY (INHALATION) at 12:01

## 2018-01-22 RX ADMIN — POLYETHYLENE GLYCOL 3350, SODIUM SULFATE ANHYDROUS, SODIUM BICARBONATE, SODIUM CHLORIDE, POTASSIUM CHLORIDE 4000 ML: 236; 22.74; 6.74; 5.86; 2.97 POWDER, FOR SOLUTION ORAL at 05:01

## 2018-01-22 RX ADMIN — CYANOCOBALAMIN 1000 MCG: 1000 INJECTION, SOLUTION INTRAMUSCULAR; SUBCUTANEOUS at 09:01

## 2018-01-22 RX ADMIN — IPRATROPIUM BROMIDE AND ALBUTEROL SULFATE 3 ML: .5; 3 SOLUTION RESPIRATORY (INHALATION) at 08:01

## 2018-01-22 RX ADMIN — PANTOPRAZOLE SODIUM 40 MG: 40 INJECTION, POWDER, FOR SOLUTION INTRAVENOUS at 09:01

## 2018-01-22 RX ADMIN — AMLODIPINE BESYLATE 5 MG: 5 TABLET ORAL at 02:01

## 2018-01-22 RX ADMIN — GUAIFENESIN 600 MG: 600 TABLET, EXTENDED RELEASE ORAL at 08:01

## 2018-01-22 RX ADMIN — IPRATROPIUM BROMIDE AND ALBUTEROL SULFATE 3 ML: .5; 3 SOLUTION RESPIRATORY (INHALATION) at 11:01

## 2018-01-22 NOTE — SUBJECTIVE & OBJECTIVE
Subjective:     Interval History: The patient looks good this evening; visiting with her family. Discussed bone marrow disorder history of her son's child with hospitalist and her son. Hematology is aware. Dr Abdi plans to do bone marrow tomorrow, and I will try to align this with endoscopy. Abdominal U/S is unremarkable for splenomegaly.    Discussed plan to do EGD and Colonoscopy tomorrow and the patient and her family are in agreement. Hgb 8 g after transfusion.     Review of Systems   Constitutional: Positive for fatigue. Negative for activity change, appetite change, chills, diaphoresis, fever and unexpected weight change.   HENT: Negative for congestion, ear discharge, ear pain, hearing loss, nosebleeds, postnasal drip and tinnitus.    Eyes: Negative for photophobia and visual disturbance.   Respiratory: Negative for apnea, cough, choking, chest tightness, shortness of breath and wheezing.    Cardiovascular: Negative for chest pain, palpitations and leg swelling.   Gastrointestinal: Negative for abdominal distention, abdominal pain, anal bleeding, blood in stool, constipation, diarrhea, nausea, rectal pain and vomiting.   Genitourinary: Negative for difficulty urinating, dyspareunia, dysuria, flank pain, frequency, hematuria, menstrual problem, pelvic pain, urgency, vaginal bleeding and vaginal discharge.   Musculoskeletal: Negative for arthralgias, back pain, gait problem, joint swelling, myalgias and neck stiffness.   Skin: Negative for pallor and rash.   Neurological: Positive for weakness. Negative for dizziness, tremors, seizures, syncope, speech difficulty, numbness and headaches.   Hematological: Negative for adenopathy.   Psychiatric/Behavioral: Negative for agitation, confusion, hallucinations, sleep disturbance and suicidal ideas.     Objective:     Vital Signs (Most Recent):  Temp: 98.3 °F (36.8 °C) (01/21/18 1709)  Pulse: 101 (01/21/18 1832)  Resp: 18 (01/21/18 1709)  BP: (!) 159/72 (01/21/18  1832)  SpO2: 98 % (01/21/18 1709) Vital Signs (24h Range):  Temp:  [98 °F (36.7 °C)-98.7 °F (37.1 °C)] 98.3 °F (36.8 °C)  Pulse:  [] 101  Resp:  [18-20] 18  SpO2:  [93 %-98 %] 98 %  BP: (149-178)/(68-87) 159/72     Weight: 72 kg (158 lb 11.7 oz) (01/21/18 0441)  Body mass index is 27.25 kg/m².      Intake/Output Summary (Last 24 hours) at 01/21/18 1848  Last data filed at 01/21/18 1738   Gross per 24 hour   Intake           1297.5 ml   Output                0 ml   Net           1297.5 ml       Lines/Drains/Airways     Peripheral Intravenous Line                 Peripheral IV - Single Lumen 01/20/18 Right Hand 1 day                Physical Exam   Vitals reviewed.      Significant Labs:  All pertinent lab results from the last 24 hours have been reviewed.      Significant Imaging:  Imaging results within the past 24 hours have been reviewed.

## 2018-01-22 NOTE — SUBJECTIVE & OBJECTIVE
Interval History:  No acute events overnight.  Son at BS.  Continues to deny any active bleeding.  Plan for Endoscopy and bone marrow biopsy tomorrow.      Review of Systems   Constitutional: Positive for fatigue. Negative for chills, diaphoresis and fever.   HENT: Negative for facial swelling, hearing loss, mouth sores, sneezing, sore throat, tinnitus and trouble swallowing.    Eyes: Negative for photophobia, pain, discharge, redness and visual disturbance.   Respiratory: Negative for apnea, cough, choking, chest tightness, shortness of breath, wheezing and stridor.    Cardiovascular: Negative for chest pain, palpitations and leg swelling.   Gastrointestinal: Negative for abdominal distention, abdominal pain, anal bleeding, blood in stool, constipation, diarrhea, nausea, rectal pain and vomiting.   Endocrine: Negative for cold intolerance, heat intolerance, polydipsia, polyphagia and polyuria.   Genitourinary: Negative for difficulty urinating, dysuria, flank pain, frequency, hematuria, pelvic pain, urgency, vaginal bleeding, vaginal discharge and vaginal pain.   Musculoskeletal: Negative for arthralgias, back pain, gait problem, myalgias and neck stiffness.   Skin: Negative for pallor, rash and wound.   Allergic/Immunologic: Negative for food allergies.   Neurological: Positive for weakness. Negative for dizziness, tremors, seizures, syncope, speech difficulty and headaches.   Hematological: Negative for adenopathy. Does not bruise/bleed easily.   Psychiatric/Behavioral: Negative for behavioral problems and confusion. The patient is not nervous/anxious.    All other systems reviewed and are negative.    Objective:     Vital Signs (Most Recent):  Temp: 98.4 °F (36.9 °C) (01/22/18 0721)  Pulse: 88 (01/22/18 1420)  Resp: 18 (01/22/18 1216)  BP: (!) 160/80 (01/22/18 1420)  SpO2: 98 % (01/22/18 1216) Vital Signs (24h Range):  Temp:  [98 °F (36.7 °C)-98.7 °F (37.1 °C)] 98.4 °F (36.9 °C)  Pulse:  [] 88  Resp:   [14-20] 18  SpO2:  [92 %-98 %] 98 %  BP: (140-178)/(66-84) 160/80     Weight: 71 kg (156 lb 8.4 oz)  Body mass index is 26.87 kg/m².    Intake/Output Summary (Last 24 hours) at 01/22/18 1432  Last data filed at 01/22/18 0600   Gross per 24 hour   Intake             1560 ml   Output                0 ml   Net             1560 ml      Physical Exam   Constitutional: She is oriented to person, place, and time. She appears well-developed and well-nourished.   HENT:   Head: Normocephalic and atraumatic.   Mouth/Throat: Oropharynx is clear and moist.   Eyes: Conjunctivae and EOM are normal. Pupils are equal, round, and reactive to light.   Neck: Normal range of motion. Neck supple.   Cardiovascular: Normal rate, regular rhythm, normal heart sounds and intact distal pulses.  Exam reveals no gallop and no friction rub.    No murmur heard.  Pulmonary/Chest: Effort normal and breath sounds normal. No respiratory distress. She has no wheezes. She has no rales. She exhibits no tenderness.   Abdominal: Soft. Bowel sounds are normal. She exhibits no distension and no mass. There is no tenderness.   Musculoskeletal: Normal range of motion. She exhibits no edema or tenderness.   Neurological: She is alert and oriented to person, place, and time.   Skin: Skin is warm and dry. No rash noted. No erythema.   Psychiatric: She has a normal mood and affect. Her behavior is normal. Judgment and thought content normal.   Nursing note and vitals reviewed.      Significant Labs:   BMP:   Recent Labs  Lab 01/22/18  0542   *      K 4.3      CO2 23   BUN 8   CREATININE 0.8   CALCIUM 8.7     CBC:   Recent Labs  Lab 01/21/18  0610  01/21/18  2311 01/22/18  0542 01/22/18  1332   WBC 2.00*  --   --  1.81*  --    HGB 8.2*  < > 8.1* 7.9* 8.2*   HCT 23.6*  < > 23.9* 22.9* 24.0*   *  --   --  106*  --    < > = values in this interval not displayed.  Coagulation: No results for input(s): PT, INR, APTT in the last 48  hours.    Significant Imaging: I have reviewed all pertinent imaging results/findings within the past 24 hours.

## 2018-01-22 NOTE — ASSESSMENT & PLAN NOTE
Reviewed note from Hematology. Agree with their recommendation. Increased LDH and bilirubin. Being worked up for hemolysis.    1/21 See note of Dr. Abdi. Bone marrow with endoscopy tomorrow.

## 2018-01-22 NOTE — ASSESSMENT & PLAN NOTE
- OCB +, but denies any active bleeding and melena since admit  - Evaluated by GI  - Plan for Endoscopy in AM  - Continue PPI  - Hgb/Hct q 8 hours  - Transfuse as needed to keep Hgb >7.0  - Monitor

## 2018-01-22 NOTE — SUBJECTIVE & OBJECTIVE
Interval History: Patient resting comfortably family at bedside discussed plans for bone marrow aspirate and biopsy in rationale for doing so    Oncology Treatment Plan:   [No treatment plan]    Medications:  Continuous Infusions:   sodium chloride 0.9% 50 mL/hr at 01/20/18 0239     Scheduled Meds:   albuterol-ipratropium 2.5mg-0.5mg/3mL  3 mL Nebulization Q8H    cyanocobalamin  1,000 mcg Subcutaneous Daily    guaiFENesin  600 mg Oral BID    lisinopril  10 mg Oral Daily    pantoprazole  40 mg Intravenous BID     PRN Meds:sodium chloride, acetaminophen, dextrose 50%, dextrose 50%, dextrose 50%, glucagon (human recombinant), glucagon (human recombinant), glucose, glucose, hydrALAZINE, insulin aspart     Review of Systems   Constitutional: Positive for activity change and fatigue. Negative for appetite change, chills, diaphoresis, fever and unexpected weight change.   HENT: Negative for congestion, dental problem, drooling, ear discharge, ear pain, facial swelling, hearing loss, mouth sores, nosebleeds, postnasal drip, rhinorrhea, sinus pressure, sneezing, sore throat, tinnitus, trouble swallowing and voice change.    Eyes: Negative for photophobia, pain, discharge, redness, itching and visual disturbance.   Respiratory: Negative for cough, choking, chest tightness, shortness of breath, wheezing and stridor.    Cardiovascular: Negative for chest pain, palpitations and leg swelling.   Gastrointestinal: Negative for abdominal distention, abdominal pain, anal bleeding, blood in stool, constipation, diarrhea, nausea, rectal pain and vomiting.   Endocrine: Negative for cold intolerance, heat intolerance, polydipsia, polyphagia and polyuria.   Genitourinary: Negative for decreased urine volume, difficulty urinating, dyspareunia, dysuria, enuresis, flank pain, frequency, genital sores, hematuria, menstrual problem, pelvic pain, urgency, vaginal bleeding, vaginal discharge and vaginal pain.   Musculoskeletal: Negative  for arthralgias, back pain, gait problem, joint swelling, myalgias, neck pain and neck stiffness.   Skin: Negative for color change, pallor and rash.   Allergic/Immunologic: Negative for environmental allergies, food allergies and immunocompromised state.   Neurological: Positive for weakness. Negative for dizziness, tremors, seizures, syncope, facial asymmetry, speech difficulty, light-headedness, numbness and headaches.   Hematological: Negative for adenopathy. Does not bruise/bleed easily.   Psychiatric/Behavioral: Positive for dysphoric mood. Negative for agitation, behavioral problems, confusion, decreased concentration, hallucinations, self-injury, sleep disturbance and suicidal ideas. The patient is nervous/anxious. The patient is not hyperactive.      Objective:     Vital Signs (Most Recent):  Temp: 98.4 °F (36.9 °C) (01/22/18 0721)  Pulse: 86 (01/22/18 0812)  Resp: 14 (01/22/18 0812)  BP: (!) 166/79 (01/22/18 0721)  SpO2: 98 % (01/22/18 0812) Vital Signs (24h Range):  Temp:  [98 °F (36.7 °C)-98.7 °F (37.1 °C)] 98.4 °F (36.9 °C)  Pulse:  [] 86  Resp:  [14-20] 14  SpO2:  [92 %-98 %] 98 %  BP: (140-178)/(66-84) 166/79     Weight: 71 kg (156 lb 8.4 oz)  Body mass index is 26.87 kg/m².  Body surface area is 1.79 meters squared.      Intake/Output Summary (Last 24 hours) at 01/22/18 0843  Last data filed at 01/22/18 0600   Gross per 24 hour   Intake             1560 ml   Output                0 ml   Net             1560 ml       Physical Exam   Constitutional: She is oriented to person, place, and time. She has a sickly appearance. She appears ill. She appears distressed.   HENT:   Head: Normocephalic and atraumatic.   Right Ear: External ear normal.   Left Ear: External ear normal.   Nose: Nose normal. Right sinus exhibits no maxillary sinus tenderness and no frontal sinus tenderness. Left sinus exhibits no maxillary sinus tenderness and no frontal sinus tenderness.   Mouth/Throat: Oropharynx is clear and  moist. No oropharyngeal exudate.   Eyes: Conjunctivae, EOM and lids are normal. Pupils are equal, round, and reactive to light. Right eye exhibits no discharge. Left eye exhibits no discharge. Right conjunctiva is not injected. Right conjunctiva has no hemorrhage. Left conjunctiva is not injected. Left conjunctiva has no hemorrhage. No scleral icterus.   Neck: Normal range of motion. Neck supple. No JVD present. No tracheal deviation present. No thyromegaly present.   Cardiovascular: Normal rate and regular rhythm.    Pulmonary/Chest: Effort normal. No stridor. No respiratory distress. She exhibits no tenderness.   Abdominal: Soft. She exhibits no distension and no mass. There is no splenomegaly or hepatomegaly. There is no tenderness. There is no rebound.   Musculoskeletal: Normal range of motion. She exhibits no edema or tenderness.   Lymphadenopathy:     She has no cervical adenopathy.     She has no axillary adenopathy.        Right: No supraclavicular adenopathy present.        Left: No supraclavicular adenopathy present.   Neurological: She is alert and oriented to person, place, and time. No cranial nerve deficit. Coordination normal.   Skin: Skin is dry. No rash noted. She is not diaphoretic. No erythema.   Psychiatric: Her behavior is normal. Judgment and thought content normal. Her mood appears anxious. She exhibits a depressed mood.   Vitals reviewed.      Significant Labs:   BMP:   Recent Labs  Lab 01/21/18  0610 01/22/18  0542   * 123*    138   K 4.3 4.3    109   CO2 22* 23   BUN 13 8   CREATININE 0.9 0.8   CALCIUM 8.8 8.7   , CBC:   Recent Labs  Lab 01/21/18  0610 01/21/18  1444 01/21/18  2311 01/22/18  0542   WBC 2.00*  --   --  1.81*   HGB 8.2* 8.0* 8.1* 7.9*   HCT 23.6* 23.6* 23.9* 22.9*   *  --   --  106*    and CMP:   Recent Labs  Lab 01/21/18  0610 01/22/18  0542    138   K 4.3 4.3    109   CO2 22* 23   * 123*   BUN 13 8   CREATININE 0.9 0.8   CALCIUM  8.8 8.7   ANIONGAP 7* 6*   EGFRNONAA >60 >60       Diagnostic Results:  I have reviewed and interpreted all pertinent imaging results/findings within the past 24 hours.

## 2018-01-22 NOTE — ASSESSMENT & PLAN NOTE
- Hemoc and GI consulted  - Could be r/t Hemolysis or Primary Liver Dx given elevated LDH; per GI, doubtful source is GI in nature  - Hgb improved to 8.2 s/p 1unit PRBCs on 1/20  - Platelets stable at 106  - Continue H/H q 8 hours  - Transfuse as needed to keep Hgb >7.0  - Retic count 5.2  - Josy negative  - Haptoglobin 30  - US of spleen on 1/21 showed a homogeneous non-enlarged spleen at 11.8 cm.    - Pathologist consulted per Hemoc for bone marrow biopsy.    - Son at  today reported his biological son was diagnosed with Dyskeratosis Congenita at the age of 6 and required a bone marrow transplant at [facility].    - B12 <146, started on daily supplementation  - Plan for Endoscopy and Bone Marrow Biopsy in AM  - Monitor  - Supportive care

## 2018-01-22 NOTE — PROGRESS NOTES
[facility] -   Hematology/Oncology  Progress Note    Patient Name: Viv Argueta  Admission Date: 1/19/2018  Hospital Length of Stay: 2 days  Code Status: Full Code     Subjective:     HPI:  67-year-old female transferred from [place] through the [facility].  For GI evaluation.  The patient was found to be pancytopenic I was asked see the patient for further evaluation.  Patient's history although limited by her ability to communicate with me stating that she was doing recently well until the last several days when she's been having increasing fatigue and weakness    Interval History: Patient resting comfortably family at bedside discussed plans for bone marrow aspirate and biopsy in rationale for doing so    Oncology Treatment Plan:   [No treatment plan]    Medications:  Continuous Infusions:   sodium chloride 0.9% 50 mL/hr at 01/20/18 0239     Scheduled Meds:   albuterol-ipratropium 2.5mg-0.5mg/3mL  3 mL Nebulization Q8H    cyanocobalamin  1,000 mcg Subcutaneous Daily    guaiFENesin  600 mg Oral BID    lisinopril  10 mg Oral Daily    pantoprazole  40 mg Intravenous BID     PRN Meds:sodium chloride, acetaminophen, dextrose 50%, dextrose 50%, dextrose 50%, glucagon (human recombinant), glucagon (human recombinant), glucose, glucose, hydrALAZINE, insulin aspart     Review of Systems   Constitutional: Positive for activity change and fatigue. Negative for appetite change, chills, diaphoresis, fever and unexpected weight change.   HENT: Negative for congestion, dental problem, drooling, ear discharge, ear pain, facial swelling, hearing loss, mouth sores, nosebleeds, postnasal drip, rhinorrhea, sinus pressure, sneezing, sore throat, tinnitus, trouble swallowing and voice change.    Eyes: Negative for photophobia, pain, discharge, redness, itching and visual disturbance.   Respiratory: Negative for cough, choking, chest tightness, shortness of breath, wheezing and  stridor.    Cardiovascular: Negative for chest pain, palpitations and leg swelling.   Gastrointestinal: Negative for abdominal distention, abdominal pain, anal bleeding, blood in stool, constipation, diarrhea, nausea, rectal pain and vomiting.   Endocrine: Negative for cold intolerance, heat intolerance, polydipsia, polyphagia and polyuria.   Genitourinary: Negative for decreased urine volume, difficulty urinating, dyspareunia, dysuria, enuresis, flank pain, frequency, genital sores, hematuria, menstrual problem, pelvic pain, urgency, vaginal bleeding, vaginal discharge and vaginal pain.   Musculoskeletal: Negative for arthralgias, back pain, gait problem, joint swelling, myalgias, neck pain and neck stiffness.   Skin: Negative for color change, pallor and rash.   Allergic/Immunologic: Negative for environmental allergies, food allergies and immunocompromised state.   Neurological: Positive for weakness. Negative for dizziness, tremors, seizures, syncope, facial asymmetry, speech difficulty, light-headedness, numbness and headaches.   Hematological: Negative for adenopathy. Does not bruise/bleed easily.   Psychiatric/Behavioral: Positive for dysphoric mood. Negative for agitation, behavioral problems, confusion, decreased concentration, hallucinations, self-injury, sleep disturbance and suicidal ideas. The patient is nervous/anxious. The patient is not hyperactive.      Objective:     Vital Signs (Most Recent):  Temp: 98.4 °F (36.9 °C) (01/22/18 0721)  Pulse: 86 (01/22/18 0812)  Resp: 14 (01/22/18 0812)  BP: (!) 166/79 (01/22/18 0721)  SpO2: 98 % (01/22/18 0812) Vital Signs (24h Range):  Temp:  [98 °F (36.7 °C)-98.7 °F (37.1 °C)] 98.4 °F (36.9 °C)  Pulse:  [] 86  Resp:  [14-20] 14  SpO2:  [92 %-98 %] 98 %  BP: (140-178)/(66-84) 166/79     Weight: 71 kg (156 lb 8.4 oz)  Body mass index is 26.87 kg/m².  Body surface area is 1.79 meters squared.      Intake/Output Summary (Last 24 hours) at 01/22/18 0843  Last  data filed at 01/22/18 0600   Gross per 24 hour   Intake             1560 ml   Output                0 ml   Net             1560 ml       Physical Exam   Constitutional: She is oriented to person, place, and time. She has a sickly appearance. She appears ill. She appears distressed.   HENT:   Head: Normocephalic and atraumatic.   Right Ear: External ear normal.   Left Ear: External ear normal.   Nose: Nose normal. Right sinus exhibits no maxillary sinus tenderness and no frontal sinus tenderness. Left sinus exhibits no maxillary sinus tenderness and no frontal sinus tenderness.   Mouth/Throat: Oropharynx is clear and moist. No oropharyngeal exudate.   Eyes: Conjunctivae, EOM and lids are normal. Pupils are equal, round, and reactive to light. Right eye exhibits no discharge. Left eye exhibits no discharge. Right conjunctiva is not injected. Right conjunctiva has no hemorrhage. Left conjunctiva is not injected. Left conjunctiva has no hemorrhage. No scleral icterus.   Neck: Normal range of motion. Neck supple. No JVD present. No tracheal deviation present. No thyromegaly present.   Cardiovascular: Normal rate and regular rhythm.    Pulmonary/Chest: Effort normal. No stridor. No respiratory distress. She exhibits no tenderness.   Abdominal: Soft. She exhibits no distension and no mass. There is no splenomegaly or hepatomegaly. There is no tenderness. There is no rebound.   Musculoskeletal: Normal range of motion. She exhibits no edema or tenderness.   Lymphadenopathy:     She has no cervical adenopathy.     She has no axillary adenopathy.        Right: No supraclavicular adenopathy present.        Left: No supraclavicular adenopathy present.   Neurological: She is alert and oriented to person, place, and time. No cranial nerve deficit. Coordination normal.   Skin: Skin is dry. No rash noted. She is not diaphoretic. No erythema.   Psychiatric: Her behavior is normal. Judgment and thought content normal. Her mood  appears anxious. She exhibits a depressed mood.   Vitals reviewed.      Significant Labs:   BMP:   Recent Labs  Lab 01/21/18  0610 01/22/18  0542   * 123*    138   K 4.3 4.3    109   CO2 22* 23   BUN 13 8   CREATININE 0.9 0.8   CALCIUM 8.8 8.7   , CBC:   Recent Labs  Lab 01/21/18  0610 01/21/18  1444 01/21/18  2311 01/22/18  0542   WBC 2.00*  --   --  1.81*   HGB 8.2* 8.0* 8.1* 7.9*   HCT 23.6* 23.6* 23.9* 22.9*   *  --   --  106*    and CMP:   Recent Labs  Lab 01/21/18  0610 01/22/18  0542    138   K 4.3 4.3    109   CO2 22* 23   * 123*   BUN 13 8   CREATININE 0.9 0.8   CALCIUM 8.8 8.7   ANIONGAP 7* 6*   EGFRNONAA >60 >60       Diagnostic Results:  I have reviewed and interpreted all pertinent imaging results/findings within the past 24 hours.    Assessment/Plan:     Pancytopenia    The patient appears to be pancytopenic review of her peripheral smear reveals no marked evidence of abnormal peripheral blood count.  Markedly elevated LDH suggests either primary liver disease or hemolysis will check reticulocyte count and haptoglobin and direct Josy.  Continue to follow.  1/21/18 extensive conversation with the daughter discussed the diagnosis of pancytopenia at this point awaiting hepatitis panel in addition I've ordered a limited ultrasound today to assess splenic size on physical exam I do not feel an enlarged spleen.  My recommendations are that the patient be considered for a bone marrow aspirate and biopsy I have followed it to the daughter article from up-to-date on pancytopenia delineated workup the patient does not have active hemolysis at the present time although her haptoglobin level is at the low end of normal.  At this time my recommendations are that if ultrasound demonstrates normal splenic size at would proceed with a bone marrow aspirate and biopsy to determine the cause pancytopenia such as primary myeloproliferative disorder such as myelofibrosis or  myelo dysplasia; 1/22/18 results of ultrasound demonstrates normal size spleen await results of hepatitis panel but at this point with normal-sized spleen pancytopenia noted family present in room recommend bone mass for biopsy spoke to pathology try to coordinate bone marrow aspirate and biopsy at the time of endoscopy with sedation            Thank you for your consult. I will follow-up with patient. Please contact us if you have any additional questions.     David Abdi MD  Hematology/Oncology  [facility]

## 2018-01-22 NOTE — PROGRESS NOTES
"[facility] Hospital Medicine  Progress Note    Patient Name: Viv Argueta  MRN: [id number]  Patient Class: IP- Inpatient   Admission Date: 1/19/2018  Length of Stay: 2 days  Attending Physician: Kwaku Taylor MD  Primary Care Provider: ELISABET Artis        Subjective:     Principal Problem:Acute blood loss anemia    HPI:  Viv Argueta is a 67 y.o. female patient  is transfer from [facility] for GI services. The patient reports she presented to the ER with complaints of "feeling bad, weak." The patient reports onset "a few days ago." Reports recently around her family member who diagnosed with the Flu ( and son). Documentation sent with patient notes pt was seen in clinic 1/19/2017 prior to ER at [place]-->Flu neg, H/H 4.5/13.5 WBC 2.2. Was diagnosed with acute bronchitis, anemia and leukoctopenia was sent to [place] ER. Further eval in [place] noted +Occult Stool, was tranfused 2 units PRBC, given and 40 IV Protonix. No exacerbating factors reported. Associated sxs include non productive cough. Patient was started on course of Cipro and Pyridim 1/11/18 for UTI. Patient denies fever, chills, congestion, sob, cp, abd pain,  or GI symptoms, and all other sxs at this time. No further complaints or concerns at this time. The patient admitted for pancytopenia, acute blood loss anemia from suspected GI bleed. Labs collected on arrival to [facility] consistent with pancytopenia, AST/ALT normal, LD elevated. Rapid HIV neg. Will further eval with anemia labs, Acute Hep panel given LD elevation and pancytopenia. Also Check Urine as well as ETOH and Drugs-->notes occasional drinker. Check Chest Xray-->independent soft read vascular congestion and ?left low consilidation. Will cover rocephin and azithromycin and check blood cultures    Hospital Course:  On 1/19 patient was admitted to [facility] for Acute Blood Loss Anemia, Pancytopenia, and presumed GI Bleed with " Occult + stool.  Upon admit here her Hgb was 7.6 after receiving 2 units of PRBCs prior to transferring here.  Hemoc and GI were consulted.  Patient is primarily Mongolian speaking requiring an interpretor.  Per Hemoc, her markedly elevated LDH suggests either primary liver disease or hemolysis.  Reticulocyte count, haptoglobin and direct Josy pending.  Per GI she would benefit from Endoscopy given she has never had a routine Colonoscopy, but doubtful that Anemia is r/t GI source.  Hgb decreased to 6.9 on 1/20, to receive 1 unit of PRBCs.  Continue Hgb/Hct q 6 hours and transfuse to keep Hgb >7.0.  CXR on admit showed mild cardiomegaly with mild pulmonary edema.  BC pending.  She has remained afebrile and has no evidence of PNA, therefore Azithromycin and Rocephin dc'd.  She was recently treated for a UTI.  Repeat UA here negative for infectious process.  UC pending.  US of spleen on 1/21 showed a homogeneous non-enlarged spleen at 11.8 cm.  Pathologist consulted per Hemoc for bone marrow biopsy.  Son at  today reported his biological son was diagnosed with Dyskeratosis Congenita at the age of 6 and required a bone marrow transplant at [facility].  Son denies known h/o other family members with this diagnosis. His wife has 2 children from a prior marriage that do not have this disorder.  Hgb stable at 8.2 s/p 1 unit PRBCs on 1/20.  Platelets stable at 106. B12 <146, started on daily supplementation. Plan for Endoscopy and bone marrow biopsy tomorrow.    Interval History:  No acute events overnight.  Son at .  Continues to deny any active bleeding.  Plan for Endoscopy and bone marrow biopsy tomorrow.      Review of Systems   Constitutional: Positive for fatigue. Negative for chills, diaphoresis and fever.   HENT: Negative for facial swelling, hearing loss, mouth sores, sneezing, sore throat, tinnitus and trouble swallowing.    Eyes: Negative for photophobia, pain, discharge, redness and visual disturbance.    Respiratory: Negative for apnea, cough, choking, chest tightness, shortness of breath, wheezing and stridor.    Cardiovascular: Negative for chest pain, palpitations and leg swelling.   Gastrointestinal: Negative for abdominal distention, abdominal pain, anal bleeding, blood in stool, constipation, diarrhea, nausea, rectal pain and vomiting.   Endocrine: Negative for cold intolerance, heat intolerance, polydipsia, polyphagia and polyuria.   Genitourinary: Negative for difficulty urinating, dysuria, flank pain, frequency, hematuria, pelvic pain, urgency, vaginal bleeding, vaginal discharge and vaginal pain.   Musculoskeletal: Negative for arthralgias, back pain, gait problem, myalgias and neck stiffness.   Skin: Negative for pallor, rash and wound.   Allergic/Immunologic: Negative for food allergies.   Neurological: Positive for weakness. Negative for dizziness, tremors, seizures, syncope, speech difficulty and headaches.   Hematological: Negative for adenopathy. Does not bruise/bleed easily.   Psychiatric/Behavioral: Negative for behavioral problems and confusion. The patient is not nervous/anxious.    All other systems reviewed and are negative.    Objective:     Vital Signs (Most Recent):  Temp: 98.4 °F (36.9 °C) (01/22/18 0721)  Pulse: 88 (01/22/18 1420)  Resp: 18 (01/22/18 1216)  BP: (!) 160/80 (01/22/18 1420)  SpO2: 98 % (01/22/18 1216) Vital Signs (24h Range):  Temp:  [98 °F (36.7 °C)-98.7 °F (37.1 °C)] 98.4 °F (36.9 °C)  Pulse:  [] 88  Resp:  [14-20] 18  SpO2:  [92 %-98 %] 98 %  BP: (140-178)/(66-84) 160/80     Weight: 71 kg (156 lb 8.4 oz)  Body mass index is 26.87 kg/m².    Intake/Output Summary (Last 24 hours) at 01/22/18 1432  Last data filed at 01/22/18 0600   Gross per 24 hour   Intake             1560 ml   Output                0 ml   Net             1560 ml      Physical Exam   Constitutional: She is oriented to person, place, and time. She appears well-developed and well-nourished.   HENT:    Head: Normocephalic and atraumatic.   Mouth/Throat: Oropharynx is clear and moist.   Eyes: Conjunctivae and EOM are normal. Pupils are equal, round, and reactive to light.   Neck: Normal range of motion. Neck supple.   Cardiovascular: Normal rate, regular rhythm, normal heart sounds and intact distal pulses.  Exam reveals no gallop and no friction rub.    No murmur heard.  Pulmonary/Chest: Effort normal and breath sounds normal. No respiratory distress. She has no wheezes. She has no rales. She exhibits no tenderness.   Abdominal: Soft. Bowel sounds are normal. She exhibits no distension and no mass. There is no tenderness.   Musculoskeletal: Normal range of motion. She exhibits no edema or tenderness.   Neurological: She is alert and oriented to person, place, and time.   Skin: Skin is warm and dry. No rash noted. No erythema.   Psychiatric: She has a normal mood and affect. Her behavior is normal. Judgment and thought content normal.   Nursing note and vitals reviewed.      Significant Labs:   BMP:   Recent Labs  Lab 01/22/18  0542   *      K 4.3      CO2 23   BUN 8   CREATININE 0.8   CALCIUM 8.7     CBC:   Recent Labs  Lab 01/21/18  0610  01/21/18  2311 01/22/18  0542 01/22/18  1332   WBC 2.00*  --   --  1.81*  --    HGB 8.2*  < > 8.1* 7.9* 8.2*   HCT 23.6*  < > 23.9* 22.9* 24.0*   *  --   --  106*  --    < > = values in this interval not displayed.  Coagulation: No results for input(s): PT, INR, APTT in the last 48 hours.    Significant Imaging: I have reviewed all pertinent imaging results/findings within the past 24 hours.    Assessment/Plan:      * Acute blood loss anemia    - Hemoc and GI consulted  - Could be r/t Hemolysis or Primary Liver Dx given elevated LDH; per GI, doubtful source is GI in nature  - Hgb improved to 8.2 s/p 1unit PRBCs on 1/20  - Platelets stable at 106  - Continue H/H q 8 hours  - Transfuse as needed to keep Hgb >7.0  - Retic count 5.2  - Josy negative  -  Haptoglobin 30  - US of spleen on 1/21 showed a homogeneous non-enlarged spleen at 11.8 cm.    - Pathologist consulted per Hemoc for bone marrow biopsy.    - Son at  today reported his biological son was diagnosed with Dyskeratosis Congenita at the age of 6 and required a bone marrow transplant at [facility].    - B12 <146, started on daily supplementation  - Plan for Endoscopy and Bone Marrow Biopsy in AM  - Monitor  - Supportive care        Pancytopenia    - Hemoc consulted  - See above  - Daily CBC  - Monitor          GI bleed    - OCB +, but denies any active bleeding and melena since admit  - Evaluated by GI  - Plan for Endoscopy in AM  - Continue PPI  - Hgb/Hct q 8 hours  - Transfuse as needed to keep Hgb >7.0  - Monitor        DM (diabetes mellitus)    - A1c 5.3  - Accu checks with SSI PRN  - Hold home Amaryl and Metformin for now  - ADA diet  - Monitor        Hypertension    - BP elevated  - Continue home Lisinopril, will add Norvasc  - Monitor          VTE Risk Mitigation         Ordered     Medium Risk of VTE  Once      01/20/18 0102     Place ABHINAV hose  Until discontinued      01/20/18 0102     Place sequential compression device  Until discontinued      01/20/18 0102     Reason for No Pharmacological VTE Prophylaxis  Once     Hold AC given Thrombocytopenia 01/20/18 0102              Joseline Perales, TAWANA, ACNP-BC  Department of Hospital Medicine   [facility]

## 2018-01-22 NOTE — PROGRESS NOTES
[facility]   Gastroenterology  Progress Note    Patient Name: Viv Argueta  MRN: [id number]  Admission Date: 1/19/2018  Hospital Length of Stay: 1 days  Code Status: Full Code   Attending Provider: Kwaku Taylor MD  Consulting Provider: Aydin Owens Iii, MD  Primary Care Physician: Primary Doctor No  Principal Problem: Acute blood loss anemia      Subjective:     Interval History: The patient looks good this evening; visiting with her family. Discussed bone marrow disorder history of her son's child with hospitalist and her son. Hematology is aware. Dr Abdi plans to do bone marrow tomorrow, and I will try to align this with endoscopy. Abdominal U/S is unremarkable for splenomegaly.    Discussed plan to do EGD and Colonoscopy tomorrow and the patient and her family are in agreement. Hgb 8 g after transfusion.     Review of Systems   Constitutional: Positive for fatigue. Negative for activity change, appetite change, chills, diaphoresis, fever and unexpected weight change.   HENT: Negative for congestion, ear discharge, ear pain, hearing loss, nosebleeds, postnasal drip and tinnitus.    Eyes: Negative for photophobia and visual disturbance.   Respiratory: Negative for apnea, cough, choking, chest tightness, shortness of breath and wheezing.    Cardiovascular: Negative for chest pain, palpitations and leg swelling.   Gastrointestinal: Negative for abdominal distention, abdominal pain, anal bleeding, blood in stool, constipation, diarrhea, nausea, rectal pain and vomiting.   Genitourinary: Negative for difficulty urinating, dyspareunia, dysuria, flank pain, frequency, hematuria, menstrual problem, pelvic pain, urgency, vaginal bleeding and vaginal discharge.   Musculoskeletal: Negative for arthralgias, back pain, gait problem, joint swelling, myalgias and neck stiffness.   Skin: Negative for pallor and rash.   Neurological: Positive for weakness. Negative for dizziness, tremors, seizures,  syncope, speech difficulty, numbness and headaches.   Hematological: Negative for adenopathy.   Psychiatric/Behavioral: Negative for agitation, confusion, hallucinations, sleep disturbance and suicidal ideas.     Objective:     Vital Signs (Most Recent):  Temp: 98.3 °F (36.8 °C) (01/21/18 1709)  Pulse: 101 (01/21/18 1832)  Resp: 18 (01/21/18 1709)  BP: (!) 159/72 (01/21/18 1832)  SpO2: 98 % (01/21/18 1709) Vital Signs (24h Range):  Temp:  [98 °F (36.7 °C)-98.7 °F (37.1 °C)] 98.3 °F (36.8 °C)  Pulse:  [] 101  Resp:  [18-20] 18  SpO2:  [93 %-98 %] 98 %  BP: (149-178)/(68-87) 159/72     Weight: 72 kg (158 lb 11.7 oz) (01/21/18 0441)  Body mass index is 27.25 kg/m².      Intake/Output Summary (Last 24 hours) at 01/21/18 1848  Last data filed at 01/21/18 1738   Gross per 24 hour   Intake           1297.5 ml   Output                0 ml   Net           1297.5 ml       Lines/Drains/Airways     Peripheral Intravenous Line                 Peripheral IV - Single Lumen 01/20/18 Right Hand 1 day                Physical Exam   Vitals reviewed.      Significant Labs:  All pertinent lab results from the last 24 hours have been reviewed.      Significant Imaging:  Imaging results within the past 24 hours have been reviewed.    Assessment/Plan:     * Acute blood loss anemia    Heme positive stool, but this is doubtful to be acute blood loss anemia. Hematology evaluating. The patient has no gross evidence of GI source of this magnitude but since she has never been evaluated before would benefit from assessment especially since stool positive. Agree with transfusion. Await results of hematology workup. Endoscopy when stable respiratory wise and resuscitated.    1/21 As noted in interval note and above. Heme positive stool. EGD and colonoscopy tomorrow.        Pancytopenia    Reviewed note from Hematology. Agree with their recommendation. Increased LDH and bilirubin. Being worked up for hemolysis.    1/21 See note of Dr. Abdi.  Bone marrow with endoscopy tomorrow.            Thank you for your consult. I will follow-up with patient. Please contact us if you have any additional questions.    Aydin Owens Iii, MD  Gastroenterology  [facility]

## 2018-01-22 NOTE — ASSESSMENT & PLAN NOTE
Heme positive stool, but this is doubtful to be acute blood loss anemia. Hematology evaluating. The patient has no gross evidence of GI source of this magnitude but since she has never been evaluated before would benefit from assessment especially since stool positive. Agree with transfusion. Await results of hematology workup. Endoscopy when stable respiratory wise and resuscitated.    1/21 As noted in interval note and above. Heme positive stool. EGD and colonoscopy tomorrow.

## 2018-01-22 NOTE — ASSESSMENT & PLAN NOTE
The patient appears to be pancytopenic review of her peripheral smear reveals no marked evidence of abnormal peripheral blood count.  Markedly elevated LDH suggests either primary liver disease or hemolysis will check reticulocyte count and haptoglobin and direct Josy.  Continue to follow.  1/21/18 extensive conversation with the daughter discussed the diagnosis of pancytopenia at this point awaiting hepatitis panel in addition I've ordered a limited ultrasound today to assess splenic size on physical exam I do not feel an enlarged spleen.  My recommendations are that the patient be considered for a bone marrow aspirate and biopsy I have followed it to the daughter article from up-to-date on pancytopenia delineated workup the patient does not have active hemolysis at the present time although her haptoglobin level is at the low end of normal.  At this time my recommendations are that if ultrasound demonstrates normal splenic size at would proceed with a bone marrow aspirate and biopsy to determine the cause pancytopenia such as primary myeloproliferative disorder such as myelofibrosis or myelo dysplasia; 1/22/18 results of ultrasound demonstrates normal size spleen await results of hepatitis panel but at this point with normal-sized spleen pancytopenia noted family present in room recommend bone mass for biopsy spoke to pathology try to coordinate bone marrow aspirate and biopsy at the time of endoscopy with sedation

## 2018-01-22 NOTE — PLAN OF CARE
Initial assessment completed. Met with patient and family . Patient denies any post hospital needs or services at this time. Appointment  Made with patient's pcp, Radha Roberts NP for 1 week.Transitional Care Folder, Discharge Planning Begins on Admission pamphlet, [facility] Bedside Delivery pamphlet, Advance Directive information given to patient along with the contact information.Instructed patient or daughter to call with any questions or concerns.       01/22/18 1220   Discharge Assessment   Assessment Type Discharge Planning Assessment   Confirmed/corrected address and phone number on facesheet? Yes   Assessment information obtained from? Patient;Caregiver;Medical Record   Expected Length of Stay (days) (TBD)   Communicated expected length of stay with patient/caregiver yes   Prior to hospitilization cognitive status: Alert/Oriented   Prior to hospitalization functional status: Independent   Current cognitive status: Alert/Oriented   Current Functional Status: Independent   Facility Arrived From: home   Lives With child(maritza), adult;spouse   Able to Return to Prior Arrangements yes   Is patient able to care for self after discharge? Yes   Who are your caregiver(s) and their phone number(s)? Alicia Argueta ( daughter) [phone]   Patient's perception of discharge disposition home or selfcare   Readmission Within The Last 30 Days no previous admission in last 30 days   Patient currently being followed by outpatient case management? No   Patient currently receives any other outside agency services? No   Equipment Currently Used at Home none   Do you have any problems affording any of your prescribed medications? TBD   Is the patient taking medications as prescribed? yes   Does the patient have transportation home? Yes   Transportation Available car;family or friend will provide   Does the patient receive services at the [facility]? No   Discharge Plan A Home;Home with family   Patient/Family In  Agreement With Plan yes

## 2018-01-22 NOTE — NURSING
Patient assessed for diabetes educational needs following chart review  She has limited English--son and daughter at bedside to assist with info  She reports was diagnosed over 5 years ago  She has a home glucose monitor but has been out of test strips,  Family will obtain more strips  She is consistent with taking her diabetes med's  Provided with Oksana Reardon literature on Managing Type 2 diabetes and Meal planning

## 2018-01-23 ENCOUNTER — ANESTHESIA (OUTPATIENT)
Dept: ENDOSCOPY | Facility: HOSPITAL | Age: 68
DRG: 369 | End: 2018-01-23
Payer: MEDICARE

## 2018-01-23 ENCOUNTER — SURGERY (OUTPATIENT)
Age: 68
End: 2018-01-23

## 2018-01-23 ENCOUNTER — ANESTHESIA EVENT (OUTPATIENT)
Dept: ENDOSCOPY | Facility: HOSPITAL | Age: 68
DRG: 369 | End: 2018-01-23
Payer: MEDICARE

## 2018-01-23 VITALS
HEART RATE: 84 BPM | DIASTOLIC BLOOD PRESSURE: 82 MMHG | OXYGEN SATURATION: 96 % | SYSTOLIC BLOOD PRESSURE: 140 MMHG | RESPIRATION RATE: 18 BRPM | WEIGHT: 155 LBS | TEMPERATURE: 99 F | BODY MASS INDEX: 26.46 KG/M2 | HEIGHT: 64 IN

## 2018-01-23 PROBLEM — K92.2 GI BLEED: Status: RESOLVED | Noted: 2018-01-20 | Resolved: 2018-01-23

## 2018-01-23 LAB
ANION GAP SERPL CALC-SCNC: 9 MMOL/L
BASOPHILS # BLD AUTO: 0 K/UL
BASOPHILS NFR BLD: 0 %
BUN SERPL-MCNC: 4 MG/DL
CALCIUM SERPL-MCNC: 9 MG/DL
CHLORIDE SERPL-SCNC: 110 MMOL/L
CO2 SERPL-SCNC: 22 MMOL/L
CREAT SERPL-MCNC: 0.8 MG/DL
DIFFERENTIAL METHOD: ABNORMAL
EOSINOPHIL # BLD AUTO: 0 K/UL
EOSINOPHIL NFR BLD: 0 %
ERYTHROCYTE [DISTWIDTH] IN BLOOD BY AUTOMATED COUNT: 29 %
EST. GFR  (AFRICAN AMERICAN): >60 ML/MIN/1.73 M^2
EST. GFR  (NON AFRICAN AMERICAN): >60 ML/MIN/1.73 M^2
GLUCOSE SERPL-MCNC: 104 MG/DL
HCT VFR BLD AUTO: 23.6 %
HCT VFR BLD AUTO: 24.8 %
HCT VFR BLD AUTO: 27.6 %
HGB BLD-MCNC: 7.8 G/DL
HGB BLD-MCNC: 8.4 G/DL
HGB BLD-MCNC: 9.1 G/DL
LYMPHOCYTES # BLD AUTO: 1 K/UL
LYMPHOCYTES NFR BLD: 47.3 %
MCH RBC QN AUTO: 33.2 PG
MCHC RBC AUTO-ENTMCNC: 33.1 G/DL
MCV RBC AUTO: 100 FL
MONOCYTES # BLD AUTO: 0.4 K/UL
MONOCYTES NFR BLD: 18 %
NEUTROPHILS # BLD AUTO: 0.7 K/UL
NEUTROPHILS NFR BLD: 35.7 %
PLATELET # BLD AUTO: 114 K/UL
PMV BLD AUTO: 9.7 FL
POCT GLUCOSE: 117 MG/DL (ref 70–110)
POCT GLUCOSE: 120 MG/DL (ref 70–110)
POCT GLUCOSE: 130 MG/DL (ref 70–110)
POCT GLUCOSE: 156 MG/DL (ref 70–110)
POTASSIUM SERPL-SCNC: 4 MMOL/L
RBC # BLD AUTO: 2.35 M/UL
SODIUM SERPL-SCNC: 141 MMOL/L
WBC # BLD AUTO: 2.05 K/UL

## 2018-01-23 PROCEDURE — 37000009 HC ANESTHESIA EA ADD 15 MINS: Performed by: INTERNAL MEDICINE

## 2018-01-23 PROCEDURE — 45378 DIAGNOSTIC COLONOSCOPY: CPT | Performed by: INTERNAL MEDICINE

## 2018-01-23 PROCEDURE — 80048 BASIC METABOLIC PNL TOTAL CA: CPT

## 2018-01-23 PROCEDURE — 88305 TISSUE EXAM BY PATHOLOGIST: CPT | Performed by: PATHOLOGY

## 2018-01-23 PROCEDURE — 0DB68ZX EXCISION OF STOMACH, VIA NATURAL OR ARTIFICIAL OPENING ENDOSCOPIC, DIAGNOSTIC: ICD-10-PCS | Performed by: INTERNAL MEDICINE

## 2018-01-23 PROCEDURE — 43239 EGD BIOPSY SINGLE/MULTIPLE: CPT | Performed by: INTERNAL MEDICINE

## 2018-01-23 PROCEDURE — 0DB38ZX EXCISION OF LOWER ESOPHAGUS, VIA NATURAL OR ARTIFICIAL OPENING ENDOSCOPIC, DIAGNOSTIC: ICD-10-PCS | Performed by: INTERNAL MEDICINE

## 2018-01-23 PROCEDURE — 85018 HEMOGLOBIN: CPT

## 2018-01-23 PROCEDURE — 88311 DECALCIFY TISSUE: CPT | Mod: 26,,, | Performed by: PATHOLOGY

## 2018-01-23 PROCEDURE — 25000003 PHARM REV CODE 250: Performed by: NURSE PRACTITIONER

## 2018-01-23 PROCEDURE — 88341 IMHCHEM/IMCYTCHM EA ADD ANTB: CPT | Mod: 26,,, | Performed by: PATHOLOGY

## 2018-01-23 PROCEDURE — 85018 HEMOGLOBIN: CPT | Mod: 91

## 2018-01-23 PROCEDURE — C9113 INJ PANTOPRAZOLE SODIUM, VIA: HCPCS | Performed by: EMERGENCY MEDICINE

## 2018-01-23 PROCEDURE — 94799 UNLISTED PULMONARY SVC/PX: CPT

## 2018-01-23 PROCEDURE — 94640 AIRWAY INHALATION TREATMENT: CPT

## 2018-01-23 PROCEDURE — 88189 FLOWCYTOMETRY/READ 16 & >: CPT | Mod: ,,, | Performed by: PATHOLOGY

## 2018-01-23 PROCEDURE — 88342 IMHCHEM/IMCYTCHM 1ST ANTB: CPT | Performed by: PATHOLOGY

## 2018-01-23 PROCEDURE — 63600175 PHARM REV CODE 636 W HCPCS: Performed by: INTERNAL MEDICINE

## 2018-01-23 PROCEDURE — 88264 CHROMOSOME ANALYSIS 20-25: CPT

## 2018-01-23 PROCEDURE — 45378 DIAGNOSTIC COLONOSCOPY: CPT | Mod: ,,, | Performed by: INTERNAL MEDICINE

## 2018-01-23 PROCEDURE — 63600175 PHARM REV CODE 636 W HCPCS: Performed by: NURSE ANESTHETIST, CERTIFIED REGISTERED

## 2018-01-23 PROCEDURE — 25000003 PHARM REV CODE 250: Performed by: EMERGENCY MEDICINE

## 2018-01-23 PROCEDURE — 25000242 PHARM REV CODE 250 ALT 637 W/ HCPCS: Performed by: NURSE PRACTITIONER

## 2018-01-23 PROCEDURE — 37000008 HC ANESTHESIA 1ST 15 MINUTES: Performed by: INTERNAL MEDICINE

## 2018-01-23 PROCEDURE — 27201012 HC FORCEPS, HOT/COLD, DISP: Performed by: INTERNAL MEDICINE

## 2018-01-23 PROCEDURE — 85014 HEMATOCRIT: CPT | Mod: 91

## 2018-01-23 PROCEDURE — 0DJD8ZZ INSPECTION OF LOWER INTESTINAL TRACT, VIA NATURAL OR ARTIFICIAL OPENING ENDOSCOPIC: ICD-10-PCS | Performed by: INTERNAL MEDICINE

## 2018-01-23 PROCEDURE — 63600175 PHARM REV CODE 636 W HCPCS: Performed by: EMERGENCY MEDICINE

## 2018-01-23 PROCEDURE — 88305 TISSUE EXAM BY PATHOLOGIST: CPT | Mod: 26,,, | Performed by: PATHOLOGY

## 2018-01-23 PROCEDURE — 88342 IMHCHEM/IMCYTCHM 1ST ANTB: CPT | Mod: 26,59,, | Performed by: PATHOLOGY

## 2018-01-23 PROCEDURE — 85025 COMPLETE CBC W/AUTO DIFF WBC: CPT

## 2018-01-23 PROCEDURE — 36415 COLL VENOUS BLD VENIPUNCTURE: CPT

## 2018-01-23 PROCEDURE — 88342 IMHCHEM/IMCYTCHM 1ST ANTB: CPT | Mod: 26,,, | Performed by: PATHOLOGY

## 2018-01-23 PROCEDURE — 88237 TISSUE CULTURE BONE MARROW: CPT

## 2018-01-23 PROCEDURE — 88185 FLOWCYTOMETRY/TC ADD-ON: CPT | Mod: 59 | Performed by: PATHOLOGY

## 2018-01-23 PROCEDURE — 88305 TISSUE EXAM BY PATHOLOGIST: CPT | Mod: 26,XS,, | Performed by: PATHOLOGY

## 2018-01-23 PROCEDURE — 99232 SBSQ HOSP IP/OBS MODERATE 35: CPT | Mod: ,,, | Performed by: INTERNAL MEDICINE

## 2018-01-23 PROCEDURE — 88184 FLOWCYTOMETRY/ TC 1 MARKER: CPT | Performed by: PATHOLOGY

## 2018-01-23 PROCEDURE — 07DR3ZX EXTRACTION OF ILIAC BONE MARROW, PERCUTANEOUS APPROACH, DIAGNOSTIC: ICD-10-PCS | Performed by: PATHOLOGY

## 2018-01-23 PROCEDURE — 85097 BONE MARROW INTERPRETATION: CPT | Mod: ,,, | Performed by: PATHOLOGY

## 2018-01-23 PROCEDURE — 88313 SPECIAL STAINS GROUP 2: CPT

## 2018-01-23 PROCEDURE — 43239 EGD BIOPSY SINGLE/MULTIPLE: CPT | Mod: 51,,, | Performed by: INTERNAL MEDICINE

## 2018-01-23 PROCEDURE — 88312 SPECIAL STAINS GROUP 1: CPT | Mod: 26,,, | Performed by: PATHOLOGY

## 2018-01-23 PROCEDURE — 85014 HEMATOCRIT: CPT

## 2018-01-23 PROCEDURE — 25000003 PHARM REV CODE 250: Performed by: NURSE ANESTHETIST, CERTIFIED REGISTERED

## 2018-01-23 PROCEDURE — 25000003 PHARM REV CODE 250: Performed by: INTERNAL MEDICINE

## 2018-01-23 PROCEDURE — 88341 IMHCHEM/IMCYTCHM EA ADD ANTB: CPT | Performed by: PATHOLOGY

## 2018-01-23 PROCEDURE — 88313 SPECIAL STAINS GROUP 2: CPT | Mod: 26,XS,, | Performed by: PATHOLOGY

## 2018-01-23 RX ORDER — SODIUM CHLORIDE, SODIUM LACTATE, POTASSIUM CHLORIDE, CALCIUM CHLORIDE 600; 310; 30; 20 MG/100ML; MG/100ML; MG/100ML; MG/100ML
INJECTION, SOLUTION INTRAVENOUS
Status: COMPLETED | OUTPATIENT
Start: 2018-01-23 | End: 2018-01-23

## 2018-01-23 RX ORDER — LIDOCAINE HYDROCHLORIDE 10 MG/ML
INJECTION INFILTRATION; PERINEURAL
Status: DISCONTINUED | OUTPATIENT
Start: 2018-01-23 | End: 2018-01-23

## 2018-01-23 RX ORDER — AMLODIPINE BESYLATE 5 MG/1
5 TABLET ORAL DAILY
Qty: 30 TABLET | Refills: 0 | Status: SHIPPED | OUTPATIENT
Start: 2018-01-24 | End: 2019-01-24

## 2018-01-23 RX ORDER — SODIUM CHLORIDE, SODIUM LACTATE, POTASSIUM CHLORIDE, CALCIUM CHLORIDE 600; 310; 30; 20 MG/100ML; MG/100ML; MG/100ML; MG/100ML
INJECTION, SOLUTION INTRAVENOUS CONTINUOUS PRN
Status: DISCONTINUED | OUTPATIENT
Start: 2018-01-23 | End: 2018-01-23

## 2018-01-23 RX ORDER — CYANOCOBALAMIN 1000 UG/ML
INJECTION, SOLUTION INTRAMUSCULAR; SUBCUTANEOUS
Qty: 10 ML | Refills: 0 | Status: SHIPPED | OUTPATIENT
Start: 2018-01-23

## 2018-01-23 RX ORDER — PROPOFOL 10 MG/ML
VIAL (ML) INTRAVENOUS
Status: DISCONTINUED | OUTPATIENT
Start: 2018-01-23 | End: 2018-01-23

## 2018-01-23 RX ADMIN — INSULIN ASPART 2 UNITS: 100 INJECTION, SOLUTION INTRAVENOUS; SUBCUTANEOUS at 12:01

## 2018-01-23 RX ADMIN — PROPOFOL 30 MG: 10 INJECTION, EMULSION INTRAVENOUS at 03:01

## 2018-01-23 RX ADMIN — PROPOFOL 40 MG: 10 INJECTION, EMULSION INTRAVENOUS at 03:01

## 2018-01-23 RX ADMIN — PROPOFOL 40 MG: 10 INJECTION, EMULSION INTRAVENOUS at 02:01

## 2018-01-23 RX ADMIN — PANTOPRAZOLE SODIUM 40 MG: 40 INJECTION, POWDER, FOR SOLUTION INTRAVENOUS at 09:01

## 2018-01-23 RX ADMIN — PROPOFOL 50 MG: 10 INJECTION, EMULSION INTRAVENOUS at 02:01

## 2018-01-23 RX ADMIN — LIDOCAINE HYDROCHLORIDE 50 MG: 10 INJECTION, SOLUTION INFILTRATION; PERINEURAL at 02:01

## 2018-01-23 RX ADMIN — CYANOCOBALAMIN 1000 MCG: 1000 INJECTION, SOLUTION INTRAMUSCULAR; SUBCUTANEOUS at 09:01

## 2018-01-23 RX ADMIN — SODIUM CHLORIDE, SODIUM LACTATE, POTASSIUM CHLORIDE, AND CALCIUM CHLORIDE: .6; .31; .03; .02 INJECTION, SOLUTION INTRAVENOUS at 02:01

## 2018-01-23 RX ADMIN — IPRATROPIUM BROMIDE AND ALBUTEROL SULFATE 3 ML: .5; 3 SOLUTION RESPIRATORY (INHALATION) at 08:01

## 2018-01-23 RX ADMIN — PROPOFOL 110 MG: 10 INJECTION, EMULSION INTRAVENOUS at 02:01

## 2018-01-23 RX ADMIN — LISINOPRIL 10 MG: 10 TABLET ORAL at 09:01

## 2018-01-23 RX ADMIN — SODIUM CHLORIDE: 0.9 INJECTION, SOLUTION INTRAVENOUS at 10:01

## 2018-01-23 RX ADMIN — PROPOFOL 20 MG: 10 INJECTION, EMULSION INTRAVENOUS at 03:01

## 2018-01-23 RX ADMIN — GUAIFENESIN 600 MG: 600 TABLET, EXTENDED RELEASE ORAL at 09:01

## 2018-01-23 RX ADMIN — SODIUM CHLORIDE, SODIUM LACTATE, POTASSIUM CHLORIDE, AND CALCIUM CHLORIDE: 600; 310; 30; 20 INJECTION, SOLUTION INTRAVENOUS at 02:01

## 2018-01-23 RX ADMIN — AMLODIPINE BESYLATE 5 MG: 5 TABLET ORAL at 09:01

## 2018-01-23 NOTE — ANESTHESIA POSTPROCEDURE EVALUATION
"Anesthesia Post Evaluation    Patient: Viv Argueta    Procedure(s) Performed: Procedure(s) (LRB):  ESOPHAGOGASTRODUODENOSCOPY (EGD) (N/A)  COLONOSCOPY (N/A)    Final Anesthesia Type: MAC  Patient location during evaluation: PACU  Patient participation: Yes- Able to Participate  Level of consciousness: awake and alert  Post-procedure vital signs: reviewed and stable  Pain management: adequate  Airway patency: patent  PONV status at discharge: No PONV  Anesthetic complications: no      Cardiovascular status: blood pressure returned to baseline  Respiratory status: unassisted  Hydration status: euvolemic  Follow-up not needed.        Visit Vitals  BP (!) 143/80 (BP Location: Left arm, Patient Position: Sitting)   Pulse 91   Temp 37.3 °C (99.2 °F) (Oral)   Resp 16   Ht 5' 4" (1.626 m)   Wt 70.3 kg (155 lb)   LMP  (LMP Unknown)   SpO2 (!) 94%   Breastfeeding? No   BMI 26.61 kg/m²       Pain/Sara Score: Pain Assessment Performed: Yes (1/23/2018 11:33 AM)  Presence of Pain: denies (1/23/2018  2:00 PM)      "

## 2018-01-23 NOTE — INTERVAL H&P NOTE
The patient has been examined and the H&P has been reviewed:I have reviewed this note and I agree with this assessment. The patient remains stable for endoscopy at the time of this present evaluation.         Anesthesia/Surgery risks, benefits and alternative options discussed and understood by patient/family.          Active Hospital Problems    Diagnosis  POA    *Acute blood loss anemia [D62]  Yes    GI bleed [K92.2]  Yes    Pancytopenia [D61.818]  Yes    Hypertension [I10]  Yes    DM (diabetes mellitus) [E11.9]  Yes      Resolved Hospital Problems    Diagnosis Date Resolved POA   No resolved problems to display.

## 2018-01-23 NOTE — TRANSFER OF CARE
"Anesthesia Transfer of Care Note    Patient: Viv Argueta    Procedure(s) Performed: Procedure(s) (LRB):  ESOPHAGOGASTRODUODENOSCOPY (EGD) (N/A)  COLONOSCOPY (N/A)    Patient location: PACU    Anesthesia Type: MAC    Transport from OR: Transported from OR on room air with adequate spontaneous ventilation    Post pain: adequate analgesia    Post assessment: no apparent anesthetic complications    Post vital signs: stable    Level of consciousness: awake    Nausea/Vomiting: no nausea/vomiting    Complications: none    Transfer of care protocol was followed      Last vitals:   Visit Vitals  BP (!) 143/80 (BP Location: Left arm, Patient Position: Sitting)   Pulse 91   Temp 37.3 °C (99.2 °F) (Oral)   Resp 16   Ht 5' 4" (1.626 m)   Wt 70.3 kg (155 lb)   LMP  (LMP Unknown)   SpO2 (!) 94%   Breastfeeding? No   BMI 26.61 kg/m²     "

## 2018-01-23 NOTE — SUBJECTIVE & OBJECTIVE
Interval History: Spoke with family explain the findings that will proceed with bone marrow aspirate and biopsy today will have results back within one week may be able to be discharged and have follow-up with me based off of her findings as workup continues  Oncology Treatment Plan:   [No treatment plan]    Medications:  Continuous Infusions:   sodium chloride 0.9% 50 mL/hr at 01/20/18 0239     Scheduled Meds:   albuterol-ipratropium 2.5mg-0.5mg/3mL  3 mL Nebulization Q8H    amLODIPine  5 mg Oral Daily    cyanocobalamin  1,000 mcg Subcutaneous Daily    guaiFENesin  600 mg Oral BID    lisinopril  10 mg Oral Daily    pantoprazole  40 mg Intravenous BID     PRN Meds:sodium chloride, acetaminophen, dextrose 50%, dextrose 50%, dextrose 50%, glucagon (human recombinant), glucagon (human recombinant), glucose, glucose, hydrALAZINE, insulin aspart     Review of Systems   Constitutional: Positive for activity change and fatigue. Negative for appetite change, chills, diaphoresis, fever and unexpected weight change.   HENT: Negative for congestion, dental problem, drooling, ear discharge, ear pain, facial swelling, hearing loss, mouth sores, nosebleeds, postnasal drip, rhinorrhea, sinus pressure, sneezing, sore throat, tinnitus, trouble swallowing and voice change.    Eyes: Negative for photophobia, pain, discharge, redness, itching and visual disturbance.   Respiratory: Negative for cough, choking, chest tightness, shortness of breath, wheezing and stridor.    Cardiovascular: Negative for chest pain, palpitations and leg swelling.   Gastrointestinal: Negative for abdominal distention, abdominal pain, anal bleeding, blood in stool, constipation, diarrhea, nausea, rectal pain and vomiting.   Endocrine: Negative for cold intolerance, heat intolerance, polydipsia, polyphagia and polyuria.   Genitourinary: Negative for decreased urine volume, difficulty urinating, dyspareunia, dysuria, enuresis, flank pain, frequency,  genital sores, hematuria, menstrual problem, pelvic pain, urgency, vaginal bleeding, vaginal discharge and vaginal pain.   Musculoskeletal: Negative for arthralgias, back pain, gait problem, joint swelling, myalgias, neck pain and neck stiffness.   Skin: Negative for color change, pallor and rash.   Allergic/Immunologic: Negative for environmental allergies, food allergies and immunocompromised state.   Neurological: Positive for weakness. Negative for dizziness, tremors, seizures, syncope, facial asymmetry, speech difficulty, light-headedness, numbness and headaches.   Hematological: Negative for adenopathy. Does not bruise/bleed easily.   Psychiatric/Behavioral: Positive for dysphoric mood. Negative for agitation, behavioral problems, confusion, decreased concentration, hallucinations, self-injury, sleep disturbance and suicidal ideas. The patient is nervous/anxious. The patient is not hyperactive.      Objective:     Vital Signs (Most Recent):  Temp: 98.1 °F (36.7 °C) (01/23/18 0726)  Pulse: 82 (01/23/18 0726)  Resp: 18 (01/23/18 0726)  BP: (!) 158/75 (01/23/18 0726)  SpO2: 98 % (01/23/18 0726) Vital Signs (24h Range):  Temp:  [98.1 °F (36.7 °C)-99.2 °F (37.3 °C)] 98.1 °F (36.7 °C)  Pulse:  [82-93] 82  Resp:  [17-18] 18  SpO2:  [96 %-98 %] 98 %  BP: (149-171)/(72-83) 158/75     Weight: 70.6 kg (155 lb 10.3 oz)  Body mass index is 26.72 kg/m².  Body surface area is 1.79 meters squared.      Intake/Output Summary (Last 24 hours) at 01/23/18 0826  Last data filed at 01/23/18 0813   Gross per 24 hour   Intake          4790.83 ml   Output                0 ml   Net          4790.83 ml       Physical Exam   Constitutional: She is oriented to person, place, and time. She appears well-developed and well-nourished. She appears distressed.   HENT:   Head: Normocephalic and atraumatic.   Right Ear: External ear normal.   Left Ear: External ear normal.   Nose: Nose normal. Right sinus exhibits no maxillary sinus tenderness  and no frontal sinus tenderness. Left sinus exhibits no maxillary sinus tenderness and no frontal sinus tenderness.   Mouth/Throat: Oropharynx is clear and moist. No oropharyngeal exudate.   Eyes: Conjunctivae, EOM and lids are normal. Pupils are equal, round, and reactive to light. Right eye exhibits no discharge. Left eye exhibits no discharge. Right conjunctiva is not injected. Right conjunctiva has no hemorrhage. Left conjunctiva is not injected. Left conjunctiva has no hemorrhage. No scleral icterus.   Neck: Normal range of motion. Neck supple. No JVD present. No tracheal deviation present. No thyromegaly present.   Cardiovascular: Normal rate and regular rhythm.    Pulmonary/Chest: Effort normal. No stridor. No respiratory distress. She exhibits no tenderness.   Abdominal: Soft. She exhibits no distension and no mass. There is no splenomegaly or hepatomegaly. There is no tenderness. There is no rebound.   Musculoskeletal: Normal range of motion. She exhibits no edema or tenderness.   Lymphadenopathy:     She has no cervical adenopathy.     She has no axillary adenopathy.        Right: No supraclavicular adenopathy present.        Left: No supraclavicular adenopathy present.   Neurological: She is alert and oriented to person, place, and time. No cranial nerve deficit. Coordination normal.   Skin: Skin is dry. No rash noted. She is not diaphoretic. No erythema.   Psychiatric: She has a normal mood and affect. Her behavior is normal. Judgment and thought content normal.   Vitals reviewed.      Significant Labs:   BMP:   Recent Labs  Lab 01/22/18  0542 01/23/18  0523   * 104    141   K 4.3 4.0    110   CO2 23 22*   BUN 8 4*   CREATININE 0.8 0.8   CALCIUM 8.7 9.0   , CBC:   Recent Labs  Lab 01/22/18  0542 01/22/18  1332 01/23/18  0003 01/23/18  0523   WBC 1.81*  --   --  2.05*   HGB 7.9* 8.2* 8.4* 7.8*   HCT 22.9* 24.0* 24.8* 23.6*   *  --   --  114*    and CMP:   Recent Labs  Lab  01/22/18  0542 01/23/18  0523    141   K 4.3 4.0    110   CO2 23 22*   * 104   BUN 8 4*   CREATININE 0.8 0.8   CALCIUM 8.7 9.0   ANIONGAP 6* 9   EGFRNONAA >60 >60       Diagnostic Results:  I have reviewed and interpreted all pertinent imaging results/findings within the past 24 hours.

## 2018-01-23 NOTE — PLAN OF CARE
Problem: Patient Care Overview  Goal: Plan of Care Review  Outcome: Ongoing (interventions implemented as appropriate)  Pt free of falls during shift. VSS.  PIV intact and infusing as ordered. NSR on tele monitor. Pt to go for colonoscopy and bone marrow biopsy tomorrow at two. Instructed by suzi to have patient drink half of golytely from 6-7:30 pm and to start the other half between 7-8:30 am tomorrow morning. Pt clear liquid diet at this time and switching to NPO at midnight. Pt on room air, no SOB noted. Blood sugars monitored as ordered, no SSI required. Call light in reach, hourly rounding made, family at bedside, will continue to monitor.

## 2018-01-23 NOTE — NURSING
Meds and POC discussed with pt and family via the language line, reference number [id number]. Questions encouraged and answered. No further needs/inquiries at this time.

## 2018-01-23 NOTE — H&P (VIEW-ONLY)
[facility]  Hematology/Oncology  Consult Note    Patient Name: Viv Argueta  MRN: [id number]  Admission Date: 2018  Hospital Length of Stay: 0 days  Code Status: Full Code   Attending Provider: Kwaku Taylor MD  Consulting Provider: David Abdi MD  Primary Care Physician: Primary Doctor No  Principal Problem:GI bleed    Consults  Subjective:     HPI:  67-year-old female transferred from [place] through the [facility].  For GI evaluation.  The patient was found to be pancytopenic I was asked see the patient for further evaluation.  Patient's history although limited by her ability to communicate with me stating that she was doing recently well until the last several days when she's been having increasing fatigue and weakness    Oncology Treatment Plan:   [No treatment plan]    Medications:  Continuous Infusions:   sodium chloride 0.9% 50 mL/hr at 18 0239     Scheduled Meds:   albuterol-ipratropium 2.5mg-0.5mg/3mL  3 mL Nebulization Q8H    azithromycin  500 mg Intravenous Q24H    cefTRIAXone (ROCEPHIN) IVPB  1 g Intravenous Q24H    guaiFENesin  600 mg Oral BID    lisinopril  10 mg Oral Daily    pantoprazole  40 mg Intravenous BID     PRN Meds:dextrose 50%, dextrose 50%, dextrose 50%, glucagon (human recombinant), glucagon (human recombinant), glucose, glucose, hydrALAZINE, insulin aspart     Review of patient's allergies indicates:  No Known Allergies     Past Medical History:   Diagnosis Date    Diabetes mellitus     Hypertension      Past Surgical History:   Procedure Laterality Date     SECTION       Family History     Family history is unknown by patient.        Social History Main Topics    Smoking status: Never Smoker    Smokeless tobacco: Never Used    Alcohol use Yes      Comment: occasional    Drug use: No    Sexual activity: No       Review of Systems   Constitutional: Positive for activity change and fatigue.  Negative for appetite change, chills, diaphoresis, fever and unexpected weight change.   HENT: Negative for congestion, dental problem, drooling, ear discharge, ear pain, facial swelling, hearing loss, mouth sores, nosebleeds, postnasal drip, rhinorrhea, sinus pressure, sneezing, sore throat, tinnitus, trouble swallowing and voice change.    Eyes: Negative for photophobia, pain, discharge, redness, itching and visual disturbance.   Respiratory: Negative for cough, choking, chest tightness, shortness of breath, wheezing and stridor.    Cardiovascular: Negative for chest pain, palpitations and leg swelling.   Gastrointestinal: Negative for abdominal distention, abdominal pain, anal bleeding, blood in stool, constipation, diarrhea, nausea, rectal pain and vomiting.   Endocrine: Negative for cold intolerance, heat intolerance, polydipsia, polyphagia and polyuria.   Genitourinary: Negative for decreased urine volume, difficulty urinating, dyspareunia, dysuria, enuresis, flank pain, frequency, genital sores, hematuria, menstrual problem, pelvic pain, urgency, vaginal bleeding, vaginal discharge and vaginal pain.   Musculoskeletal: Negative for arthralgias, back pain, gait problem, joint swelling, myalgias, neck pain and neck stiffness.   Skin: Negative for color change, pallor and rash.   Allergic/Immunologic: Negative for environmental allergies, food allergies and immunocompromised state.   Neurological: Positive for weakness. Negative for dizziness, tremors, seizures, syncope, facial asymmetry, speech difficulty, light-headedness, numbness and headaches.   Hematological: Negative for adenopathy. Does not bruise/bleed easily.   Psychiatric/Behavioral: Positive for decreased concentration and dysphoric mood. Negative for agitation, behavioral problems, confusion, hallucinations, self-injury, sleep disturbance and suicidal ideas. The patient is nervous/anxious. The patient is not hyperactive.      Objective:     Vital  Signs (Most Recent):  Temp: 98.4 °F (36.9 °C) (01/20/18 0722)  Pulse: 91 (01/20/18 0722)  Resp: 18 (01/20/18 0722)  BP: (!) 141/63 (01/20/18 0722)  SpO2: 96 % (01/20/18 0722) Vital Signs (24h Range):  Temp:  [97.9 °F (36.6 °C)-98.9 °F (37.2 °C)] 98.4 °F (36.9 °C)  Pulse:  [] 91  Resp:  [18-20] 18  SpO2:  [93 %-100 %] 96 %  BP: (141-180)/(63-84) 141/63     Weight: 69.9 kg (154 lb 1.6 oz)  Body mass index is 26.45 kg/m².  Body surface area is 1.78 meters squared.      Intake/Output Summary (Last 24 hours) at 01/20/18 0927  Last data filed at 01/20/18 0748   Gross per 24 hour   Intake            217.5 ml   Output              500 ml   Net           -282.5 ml       Physical Exam   Constitutional: She is oriented to person, place, and time. She has a sickly appearance. She appears ill. She appears distressed.   HENT:   Head: Normocephalic and atraumatic.   Right Ear: External ear normal.   Left Ear: External ear normal.   Nose: Nose normal. Right sinus exhibits no maxillary sinus tenderness and no frontal sinus tenderness. Left sinus exhibits no maxillary sinus tenderness and no frontal sinus tenderness.   Mouth/Throat: Oropharynx is clear and moist. No oropharyngeal exudate.   Eyes: Conjunctivae, EOM and lids are normal. Pupils are equal, round, and reactive to light. Right eye exhibits no discharge. Left eye exhibits no discharge. Right conjunctiva is not injected. Right conjunctiva has no hemorrhage. Left conjunctiva is not injected. Left conjunctiva has no hemorrhage. No scleral icterus.   Neck: Normal range of motion. Neck supple. No JVD present. No tracheal deviation present. No thyromegaly present.   Cardiovascular: Normal rate, regular rhythm, normal heart sounds and intact distal pulses.    Pulmonary/Chest: Effort normal and breath sounds normal. No stridor. No respiratory distress. She exhibits no tenderness.   Abdominal: Soft. Bowel sounds are normal. She exhibits no distension and no mass. There is no  splenomegaly or hepatomegaly. There is no tenderness. There is no rebound.   Musculoskeletal: Normal range of motion. She exhibits no edema or tenderness.   Lymphadenopathy:     She has no cervical adenopathy.     She has no axillary adenopathy.        Right: No supraclavicular adenopathy present.        Left: No supraclavicular adenopathy present.   Neurological: She is alert and oriented to person, place, and time. No cranial nerve deficit. Coordination normal.   Skin: Skin is dry. No rash noted. She is not diaphoretic. No erythema.   Psychiatric: Her behavior is normal. Judgment and thought content normal. Her mood appears anxious. She exhibits a depressed mood.   Vitals reviewed.      Significant Labs:   BMP:   Recent Labs  Lab 01/20/18 0007 01/20/18  0433   * 117*  117*    138  138   K 4.4 4.3  4.3    110  110   CO2 19* 20*  20*   BUN 17 15  15   CREATININE 1.0 0.9  0.9   CALCIUM 8.6* 8.6*  8.6*   , CBC:   Recent Labs  Lab 01/20/18 0007 01/20/18  0433   WBC 2.32* 2.21*  2.21*   HGB 7.6* 7.1*  7.1*  7.1*   HCT 22.0* 20.8*  20.8*  20.8*   * 116*  116*    and CMP:   Recent Labs  Lab 01/20/18 0007 01/20/18  0433    138  138   K 4.4 4.3  4.3    110  110   CO2 19* 20*  20*   * 117*  117*   BUN 17 15  15   CREATININE 1.0 0.9  0.9   CALCIUM 8.6* 8.6*  8.6*   PROT 7.3  --    ALBUMIN 3.5  --    BILITOT 2.0*  --    ALKPHOS 64  --    AST 29  --    ALT 10  --    ANIONGAP 9 8  8   EGFRNONAA 58* >60  >60       Diagnostic Results:  I have reviewed and interpreted all pertinent imaging results/findings within the past 24 hours.    Assessment/Plan:     Pancytopenia    The patient appears to be pancytopenic review of her peripheral smear reveals no marked evidence of abnormal peripheral blood count.  Markedly elevated LDH suggests either primary liver disease or hemolysis will check reticulocyte count and haptoglobin and direct Josy.  Continue to follow             Thank you for your consult. I will follow-up with patient. Please contact us if you have any additional questions.    David Abdi MD  Hematology/Oncology  [facility]

## 2018-01-23 NOTE — PLAN OF CARE
band aid to left posterior flank - iliac cresr from bone marrow biopsy is clean and dry. No tissue swelling or discoloration.

## 2018-01-23 NOTE — PLAN OF CARE
Problem: Patient Care Overview  Goal: Plan of Care Review  Outcome: Ongoing (interventions implemented as appropriate)  Fall precautions maintained, pt free from injuries/fall.  Repositions and ambulates independently.  Denies pain, nausea, vomiting.  NSR on the monitor 90's with occasional PVC's.  EGD and bone marrow biopsy done today.  POCT monitored, coverage given as needed.  POC and meds discussed with pt and family via , all verbalized understanding.  Side rails x 2, bed locked and low, phone and call light w/in reach.  Chart check done. Will cont to monitor.

## 2018-01-23 NOTE — ANESTHESIA PREPROCEDURE EVALUATION
01/23/2018  Viv Argueta is a 67 y.o., female.    Anesthesia Evaluation    I have reviewed the Patient Summary Reports.    I have reviewed the Nursing Notes.   I have reviewed the Medications.     Review of Systems  Anesthesia Hx:  No previous Anesthesia    Social:  Non-Smoker, No Alcohol Use    Hematology/Oncology:     Oncology Normal    -- Anemia:   EENT/Dental:EENT/Dental Normal   Cardiovascular:   Hypertension, well controlled ECG has been reviewed.    Pulmonary:  Pulmonary Normal    Renal/:  Renal/ Normal     Hepatic/GI:  Hepatic/GI Normal    Musculoskeletal:  Musculoskeletal Normal    Neurological:  Neurology Normal    Endocrine:   Diabetes, well controlled, type 2    Dermatological:  Skin Normal    Psych:  Psychiatric Normal           Physical Exam  General:  Well nourished    Airway/Jaw/Neck:  Airway Findings: Mallampati: III                Anesthesia Plan  Type of Anesthesia, risks & benefits discussed:  Anesthesia Type:  MAC  Patient's Preference:   Intra-op Monitoring Plan:   Intra-op Monitoring Plan Comments:   Post Op Pain Control Plan:   Post Op Pain Control Plan Comments:   Induction:   IV  Beta Blocker:  Patient is not currently on a Beta-Blocker (No further documentation required).       Informed Consent: Patient understands risks and agrees with Anesthesia plan.  Questions answered. Anesthesia consent signed with patient.  ASA Score: 3     Day of Surgery Review of History & Physical: I have interviewed and examined the patient. I have reviewed the patient's H&P dated: 01/23/18. There are no significant changes.  H&P update referred to the provider.         Ready For Surgery From Anesthesia Perspective.

## 2018-01-23 NOTE — INTERVAL H&P NOTE
The patient has been examined and the H&P has been reviewed:    I concur with the findings and no changes have occurred since H&P was written.    Anesthesia/Surgery risks, benefits and alternative options discussed and understood by patient/family.          Active Hospital Problems    Diagnosis  POA    *Acute blood loss anemia [D62]  Yes    GI bleed [K92.2]  Yes    Pancytopenia [D61.818]  Yes    Hypertension [I10]  Yes    DM (diabetes mellitus) [E11.9]  Yes      Resolved Hospital Problems    Diagnosis Date Resolved POA   No resolved problems to display.

## 2018-01-23 NOTE — ASSESSMENT & PLAN NOTE
The patient appears to be pancytopenic review of her peripheral smear reveals no marked evidence of abnormal peripheral blood count.  Markedly elevated LDH suggests either primary liver disease or hemolysis will check reticulocyte count and haptoglobin and direct Josy.  Continue to follow.  1/21/18 extensive conversation with the daughter discussed the diagnosis of pancytopenia at this point awaiting hepatitis panel in addition I've ordered a limited ultrasound today to assess splenic size on physical exam I do not feel an enlarged spleen.  My recommendations are that the patient be considered for a bone marrow aspirate and biopsy I have followed it to the daughter article from up-to-date on pancytopenia delineated workup the patient does not have active hemolysis at the present time although her haptoglobin level is at the low end of normal.  At this time my recommendations are that if ultrasound demonstrates normal splenic size at would proceed with a bone marrow aspirate and biopsy to determine the cause pancytopenia such as primary myeloproliferative disorder such as myelofibrosis or myelo dysplasia; 1/22/18 results of ultrasound demonstrates normal size spleen await results of hepatitis panel but at this point with normal-sized spleen pancytopenia noted family present in room recommend bone mass for biopsy spoke to pathology try to coordinate bone marrow aspirate and biopsy at the time of endoscopy with sedation.  1/23/18 patient will proceed with bone marrow aspirate and biopsy today results pending will see back in one week from now for review if patient is discharged discussed with family at bedside

## 2018-01-23 NOTE — BRIEF OP NOTE
Bone Marrow Biopsy  Procedure Note      Date of Service: 1/23/2018      Indication/Diagnosis: anemia    Consent Source: self    Consent Type: elective procedure    Time Out Completed: yes    Aseptic Technique: Betadine    Anesthesia: systemic    Anesthetic Drugs Used: 1% lidocaine    Instrumentation: bone marrow kit    Procedure Site: left posterior iliac crest    Patient Position: prone    Volume Removed (in cc): 8-10    Aspirate Obtained: yes: EDTA - sent to [facility] for analysis    Fluid Characteristics: not examined    Sterile Dressing: yes    Complications: none    Narrative:  Bone marrow aspiration/biopsy performed left posterior iliac crest without complications.  Patient to lie supine x 1 hr.  Follow up with Dr. Abdi.  May resume normal diet/activity.

## 2018-01-23 NOTE — ANESTHESIA RELEASE NOTE
"Anesthesia Release from PACU Note    Patient: Viv Argueta    Procedure(s) Performed: Procedure(s) (LRB):  ESOPHAGOGASTRODUODENOSCOPY (EGD) (N/A)  COLONOSCOPY (N/A)    Anesthesia type: MAC    Post pain: Adequate analgesia    Post assessment: no apparent anesthetic complications    Last Vitals:   Visit Vitals  BP (!) 143/80 (BP Location: Left arm, Patient Position: Sitting)   Pulse 91   Temp 37.3 °C (99.2 °F) (Oral)   Resp 16   Ht 5' 4" (1.626 m)   Wt 70.3 kg (155 lb)   LMP  (LMP Unknown)   SpO2 (!) 94%   Breastfeeding? No   BMI 26.61 kg/m²       Post vital signs: stable    Level of consciousness: awake    Nausea/Vomiting: no nausea/no vomiting    Complications: none    Airway Patency: patent    Respiratory: unassisted    Cardiovascular: stable and blood pressure at baseline    Hydration: euvolemic  "

## 2018-01-23 NOTE — OR NURSING
Start time 1526  Left Posteria iliac crest   Procedure complete 1530  Post Pressure applied direct per Dr. Guzman

## 2018-01-23 NOTE — H&P (VIEW-ONLY)
[facility] -   Gastroenterology  Consult Note    Patient Name: Viv Argueta  MRN: [id number]  Admission Date: 1/19/2018  Hospital Length of Stay: 0 days  Code Status: Full Code   Attending Provider: Kwaku Taylor MD   Consulting Provider: Aydin Owens Iii, MD  Primary Care Physician: Primary Doctor No  Principal Problem:GI bleed    Consults  Subjective:     HPI:  The patient is a 67 year old  female who is  with 4 children. 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 This is my 1 st encounter with the patient. Her english is poor, so Alexander  is at the bedside.                                                               Chief complaint: Weakness and fatigue                                                             Complaint of consultation: Anemia; Heme positive stool    The patient was transferred from an outside facility where she presented with complaint of worsening weakness and fatigue over the past 2 weeks. On assessment there she was found to be pancytopenic and stools tested positive for blood. She was suspected by this of having an acute blood lost anemia provoking her transfer. She has never has a GI evaluation before.    She denies abdominal pain, nausea or vomiting, anorexia or weight loss, BRBPR or melena, change in bowel habits, constipation or diarrhea, and no heartburn. She knows of no FH of GI disorders and has never had similar history herself. She admits to occasional use of Aleve which she says she may have used twice in the past 2 weeks. She does not  Use ASAs because they make her nauseous.    On presentation here her Hgb was 7.6 g. She received 2 units of PRBCs before her transfer here. Her BUN and creat are normal. There was an elevated LDH, and her LFTs are only remarkable for an elevated bilirubin. Assessment revealed the presence of vascular congestion on Chest x ray with mild cardiomegaly.      Past Medical  History:   Diagnosis Date    Diabetes mellitus     Hypertension        Past Surgical History:   Procedure Laterality Date     SECTION         Review of patient's allergies indicates:  No Known Allergies  Family History     Family history is unknown by patient.        Social History Main Topics    Smoking status: Never Smoker    Smokeless tobacco: Never Used    Alcohol use Yes      Comment: occasional    Drug use: No    Sexual activity: No     Review of Systems   Constitutional: Positive for fatigue. Negative for activity change, appetite change, chills, diaphoresis, fever and unexpected weight change.   HENT: Negative for congestion, ear discharge, ear pain, hearing loss, nosebleeds, postnasal drip and tinnitus.    Eyes: Negative for photophobia and visual disturbance.   Respiratory: Positive for cough. Negative for apnea, choking, chest tightness, shortness of breath and wheezing.    Cardiovascular: Negative for chest pain, palpitations and leg swelling.   Gastrointestinal: Negative for abdominal distention, abdominal pain, anal bleeding, blood in stool, constipation, diarrhea, nausea, rectal pain and vomiting.   Genitourinary: Positive for dysuria. Negative for difficulty urinating, dyspareunia, flank pain, frequency, hematuria, menstrual problem, pelvic pain, urgency, vaginal bleeding and vaginal discharge.   Musculoskeletal: Negative for arthralgias, back pain, gait problem, joint swelling, myalgias and neck stiffness.   Skin: Negative for pallor and rash.   Neurological: Positive for weakness. Negative for dizziness, tremors, seizures, syncope, speech difficulty, numbness and headaches.   Hematological: Negative for adenopathy.   Psychiatric/Behavioral: Negative for agitation, confusion, hallucinations, sleep disturbance and suicidal ideas.     Objective:     Vital Signs (Most Recent):  Temp: 98.4 °F (36.9 °C) (18)  Pulse: 91 (18)  Resp: 18 (18)  BP: (!) 141/63  (01/20/18 0722)  SpO2: 96 % (01/20/18 0722) Vital Signs (24h Range):  Temp:  [97.9 °F (36.6 °C)-98.9 °F (37.2 °C)] 98.4 °F (36.9 °C)  Pulse:  [] 91  Resp:  [18-20] 18  SpO2:  [93 %-100 %] 96 %  BP: (141-180)/(63-84) 141/63     Weight: 69.9 kg (154 lb 1.6 oz) (01/20/18 0230)  Body mass index is 26.45 kg/m².      Intake/Output Summary (Last 24 hours) at 01/20/18 1143  Last data filed at 01/20/18 0748   Gross per 24 hour   Intake            217.5 ml   Output              500 ml   Net           -282.5 ml       Lines/Drains/Airways     Peripheral Intravenous Line                 Peripheral IV - Single Lumen 01/20/18 0045 Left Hand less than 1 day         Peripheral IV - Single Lumen 01/20/18 Right Hand less than 1 day                Physical Exam   Constitutional: She is oriented to person, place, and time. She appears well-developed and well-nourished.   HENT:   Head: Normocephalic and atraumatic.   Bilateral turbinate congestion   Eyes: EOM are normal. Pupils are equal, round, and reactive to light. Right eye exhibits no discharge. Left eye exhibits no discharge. No scleral icterus.   Conjunctivae pale   Neck: Normal range of motion. Neck supple. No JVD present. No thyromegaly present.   Cardiovascular: Regular rhythm, normal heart sounds and intact distal pulses.  Exam reveals no gallop and no friction rub.    No murmur heard.  Mild Tachycardia   Pulmonary/Chest: Effort normal. No respiratory distress. She has wheezes. She has no rales. She exhibits no tenderness.   Abdominal: Soft. Bowel sounds are normal. She exhibits no distension and no mass. There is no tenderness. There is no rebound and no guarding.   Musculoskeletal: Normal range of motion. She exhibits no edema.   Lymphadenopathy:     She has no cervical adenopathy.   Neurological: She is alert and oriented to person, place, and time. She has normal reflexes. She exhibits normal muscle tone. Coordination normal.   Skin: Skin is warm and dry. No rash  noted. No erythema. No pallor.   Psychiatric: She has a normal mood and affect. Her behavior is normal. Judgment and thought content normal.   Vitals reviewed.      Significant Labs:  All pertinent lab results from the last 24 hours have been reviewed.    Significant Imaging:  Imaging results within the past 24 hours have been reviewed.    Assessment/Plan:     * GI bleed    See anemia above.        DM (diabetes mellitus)    As/per attending service.        Chest x-ray abnormality    As/per attending service        Hypertension    As/per attending service. GH        Acute blood loss anemia    Heme positive stool, but this is doubtful to be acute blood loss anemia. Hematology evaluating. The patient has no gross evidence of GI source of this magnitude but since she has never been evaluated before would benefit from assessment especially since stool positive. Agree with transfusion. Await results of hematology workup. Endoscopy when stable respiratory wise and resuscitated.        Pancytopenia    Reviewed note from Hematology. Agree with their recommendation. Increased LDH and bilirubin. Being worked up for hemolysis.            Thank you for your consult. I will follow-up with patient. Please contact us if you have any additional questions.    Aydin Owens Iii, MD  Gastroenterology  [facility]

## 2018-01-23 NOTE — DISCHARGE SUMMARY
"[facility] Hospital Medicine  Discharge Summary      Patient Name: Viv Argueta  MRN: [id number]  Admission Date: 1/19/2018  Hospital Length of Stay: 3 days  Discharge Date and Time:  01/23/2018 5:30 PM  Attending Physician: Kwaku Taylor MD   Discharging Provider: Joseline Perales NP  Primary Care Provider: ELISABET Artis      HPI:   Viv Argueta is a 67 y.o. female patient  is transfer from [facility] for GI services. The patient reports she presented to the ER with complaints of "feeling bad, weak." The patient reports onset "a few days ago." Reports recently around her family member who diagnosed with the Flu ( and son). Documentation sent with patient notes pt was seen in clinic 1/19/2017 prior to ER at [place]-->Flu neg, H/H 4.5/13.5 WBC 2.2. Was diagnosed with acute bronchitis, anemia and leukoctopenia was sent to [place] ER. Further eval in [place] noted +Occult Stool, was tranfused 2 units PRBC, given and 40 IV Protonix. No exacerbating factors reported. Associated sxs include non productive cough. Patient was started on course of Cipro and Pyridim 1/11/18 for UTI. Patient denies fever, chills, congestion, sob, cp, abd pain,  or GI symptoms, and all other sxs at this time. No further complaints or concerns at this time. The patient admitted for pancytopenia, acute blood loss anemia from suspected GI bleed. Labs collected on arrival to [facility] consistent with pancytopenia, AST/ALT normal, LD elevated. Rapid HIV neg. Will further eval with anemia labs, Acute Hep panel given LD elevation and pancytopenia. Also Check Urine as well as ETOH and Drugs-->notes occasional drinker. Check Chest Xray-->independent soft read vascular congestion and ?left low consilidation. Will cover rocephin and azithromycin and check blood cultures    Procedure(s) (LRB):  ESOPHAGOGASTRODUODENOSCOPY (EGD) (N/A)  COLONOSCOPY (N/A)      Hospital Course:   On 1/19 " patient was admitted to [facility] for Acute Blood Loss Anemia, Pancytopenia, and presumed GI Bleed with Occult + stool.  Upon admit here her Hgb was 7.6 after receiving 2 units of PRBCs prior to transferring here.  Hemoc and GI were consulted.  Patient is primarily Micronesian speaking requiring an interpretor.  Per Hemoc, her markedly elevated LDH (707) suggests either primary liver disease or hemolysis.  Reticulocyte count 5.5, haptoglobin 30 and direct Josy negative.  Pt/INR and PTT WNL.  UDS negative.  Per GI she would benefit from Endoscopy given she has never had a routine Colonoscopy, but doubtful that Anemia is r/t GI source.  CXR on admit showed mild cardiomegaly with mild pulmonary edema.  BC and UC show NGTD.  She has remained afebrile and has no evidence of PNA, therefore Azithromycin and Rocephin dc'd.  She was recently treated for a UTI.  Repeat UA here and UC negative for infectious process. US of spleen on 1/21 showed a homogeneous non-enlarged spleen at 11.8 cm.  Pathologist consulted per Hemoc for bone marrow biopsy.  Son at  today reported his biological son was diagnosed with Dyskeratosis Congenita at the age of 6 and required a bone marrow transplant at [facility].  Son denies known h/o other family members with this diagnosis. His wife has 2 children from a prior marriage that do not have this disorder.  Hgb improved to 8.6 s/p 1 unit PRBCs on 1/20 and is now 7.8.  Platelets stable at 114. B12 <146, started on daily supplementation with 1000 mcg sq on 1/21.  Patient will continue daily treatment to complete 7 days, then weekly x 1 month, then monthly replacement.  Acute Hepatitis panel and HIV negative.  She is s/p EGD and Colonoscopy per Dr. Owens today and bone marrow biopsy.  Colonoscopy noted to be normal.  EGD showed Mildly severe candidiasis esophagitis, Gastric mucosal atrophy and a single mucosal papule (nodule) found in the stomach, all of which were biopsied. She will need to f/u with   Roman in 2 weeks for biopsy results.  Case d/w Dr. Abdi and Dr. Owens.  Ok to DC home from Hemoc and GI standpoint tonight with outpatient f/u.  Patient will need to f/u with Dr. Abdi in 1 week for Bone Marrow biopsy results.  She will need to f/u with Dr. Owens in 2 weeks for EGD biopsy results.  Case also d/w Dr. Taylor.  Patient seen and examined post procedures and deemed medically stable to DC home today if she tolerates a FL diet without difficulty.   DSG to BM biopsy site CDI.  Medications reconciled for DC home.  Daughter educated on how to administer B12 sq.  Patient and family verbalized + understanding of DC instructions.       Consults:   Consults         Status Ordering Provider     Inpatient consult to Gastroenterology  Once     Provider:  Aydin Owens III, MD    Completed HAYLEE MCCURDY     Inpatient consult to Oncology  Once     Provider:  Juana bAdi MD    Acknowledged HAYLEE MCCURDY     Inpatient consult to Pathology  Once     Provider:  (Not yet assigned)    Acknowledged JUANA ABDI          No new Assessment & Plan notes have been filed under this hospital service since the last note was generated.  Service: Hospital Medicine    Final Active Diagnoses:    Diagnosis Date Noted POA    PRINCIPAL PROBLEM:  Acute blood loss anemia [D62] 01/20/2018 Yes    Pancytopenia [D61.818] 01/20/2018 Yes    DM (diabetes mellitus) [E11.9] 01/20/2018 Yes    Hypertension [I10] 01/20/2018 Yes      Problems Resolved During this Admission:    Diagnosis Date Noted Date Resolved POA    GI bleed [K92.2] 01/20/2018 01/23/2018 Yes       Discharged Condition: stable    Disposition: Home or Self Care    Follow Up:  Follow-up Information     Radha Roberts NP. Go on 1/29/2018.    Why:  @ 11am for your hopsital follow up appointment. Will need routine monitoring of Vitamin B12 level.   Contact information:  [address] [place] [address]. [address] [phone]           Juana Abdi MD In 1  week.    Specialty:  Hematology and Oncology  Why:  F/U with Hematology for Bone Marrow Biopsy results  Contact information:  [address]  [place] [address] [phone]             Aydin Owens Iii, MD In 2 weeks.    Specialty:  Gastroenterology  Why:  F/U with GI for Biopsy results  Contact information:  [address]  [place] [id number] [phone]                 Patient Instructions:     Diet diabetic     Activity as tolerated     Notify your health care provider if you experience any of the following:  temperature >100.4     Notify your health care provider if you experience any of the following:  persistent nausea and vomiting or diarrhea     Notify your health care provider if you experience any of the following:  severe uncontrolled pain     Notify your health care provider if you experience any of the following:  redness, tenderness, or signs of infection (pain, swelling, redness, odor or green/yellow discharge around incision site)     Notify your health care provider if you experience any of the following:  difficulty breathing or increased cough     Notify your health care provider if you experience any of the following:  severe persistent headache     Notify your health care provider if you experience any of the following:  worsening rash     Notify your health care provider if you experience any of the following:  persistent dizziness, light-headedness, or visual disturbances     Notify your health care provider if you experience any of the following:  increased confusion or weakness         Significant Diagnostic Studies: Labs:   CMP     Recent Labs  Lab 01/22/18  0542 01/23/18  0523    141   K 4.3 4.0    110   CO2 23 22*   * 104   BUN 8 4*   CREATININE 0.8 0.8   CALCIUM 8.7 9.0   ANIONGAP 6* 9   ESTGFRAFRICA >60 >60   EGFRNONAA >60 >60   , CBC     Recent Labs  Lab 01/22/18  0542 01/22/18  1332 01/23/18  0003 01/23/18  0523   WBC 1.81*  --   --  2.05*   HGB  7.9* 8.2* 8.4* 7.8*   HCT 22.9* 24.0* 24.8* 23.6*   *  --   --  114*   , INR   Lab Results   Component Value Date    INR 1.0 01/20/2018    INR 1.0 01/20/2018    and A1C:     Recent Labs  Lab 01/20/18 0433   HGBA1C 5.3     Microbiology:   Blood Culture   Lab Results   Component Value Date    LABBLOO No Growth to date 01/20/2018    LABBLOO No Growth to date 01/20/2018    LABBLOO No Growth to date 01/20/2018    and Urine Culture    Lab Results   Component Value Date    LABURIN No growth 01/20/2018     Imaging Results          US Abdomen Limited (Final result)  Result time 01/21/18 11:19:07    Final result by Colleen Perea MD (01/21/18 11:19:07)                 Impression:      Please see above.      Electronically signed by: COLLEEN PEREA MD  Date:     01/21/18  Time:    11:19              Narrative:    Exam: US ABDOMEN LIMITED,    Date:  01/21/18 11:07:57    History: Assess splenic size    Comparison:  No prior relevant studies available    Findings: Ultrasound of the spleen demonstrates a homogeneous non-enlarged spleen at 11.8 cm.                             X-Ray Chest PA And Lateral (Final result)  Result time 01/20/18 07:30:15    Final result by LAYNE Montes De Oca Sr., MD (01/20/18 07:30:15)                 Impression:      The findings are characteristic of mild cardiomegaly with mild pulmonary edema.      Electronically signed by: LAYNE MONTES DE OCA MD  Date:     01/20/18  Time:    07:30              Narrative:    Two-view chest x-ray    Clinical History:  Cough    Finding: There is mild cardiomegaly. There is a mild amount of interstitial and alveolar opacities seen in the medial aspect of both lungs. There is no pneumothorax or pleural effusion.                              Pending Diagnostic Studies:     Procedure Component Value Units Date/Time    Amylase [[id number]] Collected:  01/20/18 0433    Order Status:  Sent Lab Status:  In process Updated:  01/20/18 0438    Specimen:  Blood from Blood      Bone Marrow Prep and Stain [[id number]] Collected:  01/23/18 1544    Order Status:  Sent Lab Status:  In process Updated:  01/23/18 1544    Specimen:  Bone Marrow from Bone Marrow     Chromosome Analysis, Bone Marrow [[id number]] Collected:  01/23/18 1544    Order Status:  Sent Lab Status:  In process Updated:  01/23/18 1545    Specimen:  Bone Marrow from Bone Marrow     Ethanol [[id number]] Collected:  01/20/18 0433    Order Status:  Sent Lab Status:  In process Updated:  01/20/18 0438    Specimen:  Blood from Blood     Hematocrit [[id number]]     Order Status:  Sent Lab Status:  No result     Specimen:  Blood from Blood     Hemoglobin [[id number]]     Order Status:  Sent Lab Status:  No result     Specimen:  Blood from Blood     Iron Stain, Bone Marrow [[id number]] Collected:  01/23/18 1544    Order Status:  Sent Lab Status:  In process Updated:  01/23/18 1544    Specimen:  Bone Marrow from Bone Marrow     Leukemia/Lymphoma Screen - Bone Marrow Left Posterior Iliac Crest [[id number]] Collected:  01/23/18 1544    Order Status:  Sent Lab Status:  In process Updated:  01/23/18 1545    Specimen:  Bone Marrow     Lipase [[id number]] Collected:  01/20/18 0433    Order Status:  Sent Lab Status:  In process Updated:  01/20/18 0438    Specimen:  Blood from Blood     Magnesium [[id number]] Collected:  01/20/18 0433    Order Status:  Sent Lab Status:  In process Updated:  01/20/18 0438    Specimen:  Blood from Blood     Phosphatidylethanol (PETH) [[id number]] Collected:  01/20/18 0433    Order Status:  Sent Lab Status:  In process Updated:  01/20/18 1820    Specimen:  Blood from Blood     Phosphorus [[id number]] Collected:  01/20/18 0433    Order Status:  Sent Lab Status:  In process Updated:  01/20/18 0438    Specimen:  Blood from Blood     Tissue Specimen to Pathology, Bone Marrow Aspiration/Biopsy Procedure [[id number]] Collected:  01/23/18 1543    Order Status:  Sent Lab Status:  No result     Specimen:  Bone Marrow from Bone Marrow  Aspirate, Left Iliac Crest     Tissue Specimen to Pathology, Bone Marrow Aspiration/Biopsy Procedure [[id number]] Collected:  01/23/18 1543    Order Status:  Sent Lab Status:  No result     Specimen:  Bone Marrow from Bone Marrow Aspirate, Left Iliac Crest     Tissue Specimen to Pathology, Bone Marrow Aspiration/Biopsy Procedure [[id number]] Collected:  01/23/18 1543    Order Status:  Sent Lab Status:  No result     Specimen:  Bone Marrow from Bone Marrow Aspirate, Left Iliac Crest          Medications:  Reconciled Home Medications:   Current Discharge Medication List      START taking these medications    Details   amLODIPine (NORVASC) 5 MG tablet Take 1 tablet (5 mg total) by mouth once daily.  Qty: 30 tablet, Refills: 0      cyanocobalamin 1,000 mcg/mL injection Vitamin B12, 1000 mcg sq daily x 4 days, then 1000 mcg sq once weekly for 1 month, then 1000 mcg sq every month  Qty: 10 mL, Refills: 0         CONTINUE these medications which have NOT CHANGED    Details   glimepiride (AMARYL) 2 MG tablet Take 2 mg by mouth before breakfast.      lisinopril 10 MG tablet Take 10 mg by mouth once daily.      metFORMIN (GLUCOPHAGE) 1000 MG tablet Take 1,000 mg by mouth 2 (two) times daily with meals.      phenazopyridine (PYRIDIUM) 100 MG tablet Take 100 mg by mouth 3 (three) times daily as needed for Pain.         STOP taking these medications       ciprofloxacin HCl (CIPRO) 500 MG tablet Comments:   Reason for Stopping:               Indwelling Lines/Drains at time of discharge:   Lines/Drains/Airways          No matching active lines, drains, or airways          Time spent on the discharge of patient: 50 minutes  Patient was seen and examined on the date of discharge and determined to be suitable for discharge.         Joseline Perales, DNP, ACNP-BC  Department of Hospital Medicine  [facility]

## 2018-01-23 NOTE — PROGRESS NOTES
[facility] -   Hematology/Oncology  Progress Note    Patient Name: Viv Argueta  Admission Date: 1/19/2018  Hospital Length of Stay: 3 days  Code Status: Full Code     Subjective:     HPI:  67-year-old female transferred from [place] through the [facility].  For GI evaluation.  The patient was found to be pancytopenic I was asked see the patient for further evaluation.  Patient's history although limited by her ability to communicate with me stating that she was doing recently well until the last several days when she's been having increasing fatigue and weakness    Interval History: Spoke with family explain the findings that will proceed with bone marrow aspirate and biopsy today will have results back within one week may be able to be discharged and have follow-up with me based off of her findings as workup continues  Oncology Treatment Plan:   [No treatment plan]    Medications:  Continuous Infusions:   sodium chloride 0.9% 50 mL/hr at 01/20/18 0239     Scheduled Meds:   albuterol-ipratropium 2.5mg-0.5mg/3mL  3 mL Nebulization Q8H    amLODIPine  5 mg Oral Daily    cyanocobalamin  1,000 mcg Subcutaneous Daily    guaiFENesin  600 mg Oral BID    lisinopril  10 mg Oral Daily    pantoprazole  40 mg Intravenous BID     PRN Meds:sodium chloride, acetaminophen, dextrose 50%, dextrose 50%, dextrose 50%, glucagon (human recombinant), glucagon (human recombinant), glucose, glucose, hydrALAZINE, insulin aspart     Review of Systems   Constitutional: Positive for activity change and fatigue. Negative for appetite change, chills, diaphoresis, fever and unexpected weight change.   HENT: Negative for congestion, dental problem, drooling, ear discharge, ear pain, facial swelling, hearing loss, mouth sores, nosebleeds, postnasal drip, rhinorrhea, sinus pressure, sneezing, sore throat, tinnitus, trouble swallowing and voice change.    Eyes: Negative for photophobia, pain,  discharge, redness, itching and visual disturbance.   Respiratory: Negative for cough, choking, chest tightness, shortness of breath, wheezing and stridor.    Cardiovascular: Negative for chest pain, palpitations and leg swelling.   Gastrointestinal: Negative for abdominal distention, abdominal pain, anal bleeding, blood in stool, constipation, diarrhea, nausea, rectal pain and vomiting.   Endocrine: Negative for cold intolerance, heat intolerance, polydipsia, polyphagia and polyuria.   Genitourinary: Negative for decreased urine volume, difficulty urinating, dyspareunia, dysuria, enuresis, flank pain, frequency, genital sores, hematuria, menstrual problem, pelvic pain, urgency, vaginal bleeding, vaginal discharge and vaginal pain.   Musculoskeletal: Negative for arthralgias, back pain, gait problem, joint swelling, myalgias, neck pain and neck stiffness.   Skin: Negative for color change, pallor and rash.   Allergic/Immunologic: Negative for environmental allergies, food allergies and immunocompromised state.   Neurological: Positive for weakness. Negative for dizziness, tremors, seizures, syncope, facial asymmetry, speech difficulty, light-headedness, numbness and headaches.   Hematological: Negative for adenopathy. Does not bruise/bleed easily.   Psychiatric/Behavioral: Positive for dysphoric mood. Negative for agitation, behavioral problems, confusion, decreased concentration, hallucinations, self-injury, sleep disturbance and suicidal ideas. The patient is nervous/anxious. The patient is not hyperactive.      Objective:     Vital Signs (Most Recent):  Temp: 98.1 °F (36.7 °C) (01/23/18 0726)  Pulse: 82 (01/23/18 0726)  Resp: 18 (01/23/18 0726)  BP: (!) 158/75 (01/23/18 0726)  SpO2: 98 % (01/23/18 0726) Vital Signs (24h Range):  Temp:  [98.1 °F (36.7 °C)-99.2 °F (37.3 °C)] 98.1 °F (36.7 °C)  Pulse:  [82-93] 82  Resp:  [17-18] 18  SpO2:  [96 %-98 %] 98 %  BP: (149-171)/(72-83) 158/75     Weight: 70.6 kg (155 lb  10.3 oz)  Body mass index is 26.72 kg/m².  Body surface area is 1.79 meters squared.      Intake/Output Summary (Last 24 hours) at 01/23/18 0826  Last data filed at 01/23/18 0813   Gross per 24 hour   Intake          4790.83 ml   Output                0 ml   Net          4790.83 ml       Physical Exam   Constitutional: She is oriented to person, place, and time. She appears well-developed and well-nourished. She appears distressed.   HENT:   Head: Normocephalic and atraumatic.   Right Ear: External ear normal.   Left Ear: External ear normal.   Nose: Nose normal. Right sinus exhibits no maxillary sinus tenderness and no frontal sinus tenderness. Left sinus exhibits no maxillary sinus tenderness and no frontal sinus tenderness.   Mouth/Throat: Oropharynx is clear and moist. No oropharyngeal exudate.   Eyes: Conjunctivae, EOM and lids are normal. Pupils are equal, round, and reactive to light. Right eye exhibits no discharge. Left eye exhibits no discharge. Right conjunctiva is not injected. Right conjunctiva has no hemorrhage. Left conjunctiva is not injected. Left conjunctiva has no hemorrhage. No scleral icterus.   Neck: Normal range of motion. Neck supple. No JVD present. No tracheal deviation present. No thyromegaly present.   Cardiovascular: Normal rate and regular rhythm.    Pulmonary/Chest: Effort normal. No stridor. No respiratory distress. She exhibits no tenderness.   Abdominal: Soft. She exhibits no distension and no mass. There is no splenomegaly or hepatomegaly. There is no tenderness. There is no rebound.   Musculoskeletal: Normal range of motion. She exhibits no edema or tenderness.   Lymphadenopathy:     She has no cervical adenopathy.     She has no axillary adenopathy.        Right: No supraclavicular adenopathy present.        Left: No supraclavicular adenopathy present.   Neurological: She is alert and oriented to person, place, and time. No cranial nerve deficit. Coordination normal.   Skin: Skin  is dry. No rash noted. She is not diaphoretic. No erythema.   Psychiatric: She has a normal mood and affect. Her behavior is normal. Judgment and thought content normal.   Vitals reviewed.      Significant Labs:   BMP:   Recent Labs  Lab 01/22/18  0542 01/23/18  0523   * 104    141   K 4.3 4.0    110   CO2 23 22*   BUN 8 4*   CREATININE 0.8 0.8   CALCIUM 8.7 9.0   , CBC:   Recent Labs  Lab 01/22/18  0542 01/22/18  1332 01/23/18  0003 01/23/18 0523   WBC 1.81*  --   --  2.05*   HGB 7.9* 8.2* 8.4* 7.8*   HCT 22.9* 24.0* 24.8* 23.6*   *  --   --  114*    and CMP:   Recent Labs  Lab 01/22/18  0542 01/23/18  0523    141   K 4.3 4.0    110   CO2 23 22*   * 104   BUN 8 4*   CREATININE 0.8 0.8   CALCIUM 8.7 9.0   ANIONGAP 6* 9   EGFRNONAA >60 >60       Diagnostic Results:  I have reviewed and interpreted all pertinent imaging results/findings within the past 24 hours.    Assessment/Plan:     Pancytopenia    The patient appears to be pancytopenic review of her peripheral smear reveals no marked evidence of abnormal peripheral blood count.  Markedly elevated LDH suggests either primary liver disease or hemolysis will check reticulocyte count and haptoglobin and direct Josy.  Continue to follow.  1/21/18 extensive conversation with the daughter discussed the diagnosis of pancytopenia at this point awaiting hepatitis panel in addition I've ordered a limited ultrasound today to assess splenic size on physical exam I do not feel an enlarged spleen.  My recommendations are that the patient be considered for a bone marrow aspirate and biopsy I have followed it to the daughter article from up-to-date on pancytopenia delineated workup the patient does not have active hemolysis at the present time although her haptoglobin level is at the low end of normal.  At this time my recommendations are that if ultrasound demonstrates normal splenic size at would proceed with a bone marrow aspirate  and biopsy to determine the cause pancytopenia such as primary myeloproliferative disorder such as myelofibrosis or myelo dysplasia; 1/22/18 results of ultrasound demonstrates normal size spleen await results of hepatitis panel but at this point with normal-sized spleen pancytopenia noted family present in room recommend bone mass for biopsy spoke to pathology try to coordinate bone marrow aspirate and biopsy at the time of endoscopy with sedation.  1/23/18 patient will proceed with bone marrow aspirate and biopsy today results pending will see back in one week from now for review if patient is discharged discussed with family at bedside            Thank you for your consult. I will follow-up with patient. Please contact us if you have any additional questions.     David Abdi MD  Hematology/Oncology  [facility]

## 2018-01-23 NOTE — PLAN OF CARE
Problem: Patient Care Overview  Goal: Plan of Care Review  Outcome: Ongoing (interventions implemented as appropriate)  Pt remained free of injury during shift, stable condition, denied pain, no acute distress, receiving IV fluids, tolerating clear liquid diet, Pt drank 1/2 of bowel prep at beginning of shift, Pt denied having blood in stools, blood glucose monitoring performed, on cardiac monitoring (SR, HR 80-90s), and will continue to monitor. 24hr chart review performed.

## 2018-01-24 ENCOUNTER — TELEPHONE (OUTPATIENT)
Dept: HEMATOLOGY/ONCOLOGY | Facility: CLINIC | Age: 68
End: 2018-01-24

## 2018-01-24 ENCOUNTER — TELEPHONE (OUTPATIENT)
Dept: GASTROENTEROLOGY | Facility: CLINIC | Age: 68
End: 2018-01-24

## 2018-01-24 LAB
BLD PROD TYP BPU: NORMAL
BLOOD UNIT EXPIRATION DATE: NORMAL
BLOOD UNIT TYPE CODE: 5100
BLOOD UNIT TYPE: NORMAL
BONE MARROW IRON STAIN COMMENT: NORMAL
CODING SYSTEM: NORMAL
DISPENSE STATUS: NORMAL
NUM UNITS TRANS PACKED RBC: NORMAL

## 2018-01-24 NOTE — NURSING
"Discharge instructions, medications, and appointments reviewed with patient and family via  GAUTAM reference number [id number]. Daughter demonstrated proper way of wiping vial and withdrawing medication from it. Verbalized that she will administer it in the abdomen, on the "fatty part." Demonstrated proper way of engaging the safety mechanism. And verbalized proper disposable.     "

## 2018-01-24 NOTE — TELEPHONE ENCOUNTER
----- Message from Monie Khan RN sent at 1/23/2018  6:28 PM CST -----  Please schedule follow up appointment for Dr Abdi to discuss pathology results.  Patient had a biopsy done in the hospital.  Thank you

## 2018-01-24 NOTE — PLAN OF CARE
01/24/18 0959   Final Note   Assessment Type Final Discharge Note   Discharge Disposition Home

## 2018-01-24 NOTE — TELEPHONE ENCOUNTER
----- Message from David Abdi MD sent at 1/24/2018  8:52 AM CST -----  Patient was seen in the hospital this week underwent bone marrow needs to be seen next week the week of 1/29/18 she has an appointment for later in February but cannot wait that long

## 2018-01-25 ENCOUNTER — OFFICE VISIT (OUTPATIENT)
Dept: HEMATOLOGY/ONCOLOGY | Facility: CLINIC | Age: 68
End: 2018-01-25
Payer: MEDICARE

## 2018-01-25 VITALS
SYSTOLIC BLOOD PRESSURE: 153 MMHG | TEMPERATURE: 99 F | DIASTOLIC BLOOD PRESSURE: 84 MMHG | WEIGHT: 156.31 LBS | OXYGEN SATURATION: 95 % | HEIGHT: 61 IN | BODY MASS INDEX: 29.51 KG/M2 | HEART RATE: 84 BPM

## 2018-01-25 DIAGNOSIS — D61.818 PANCYTOPENIA: Primary | ICD-10-CM

## 2018-01-25 DIAGNOSIS — D51.0 VITAMIN B12 DEFICIENCY ANEMIA DUE TO INTRINSIC FACTOR DEFICIENCY: ICD-10-CM

## 2018-01-25 LAB
BACTERIA BLD CULT: NORMAL
BACTERIA BLD CULT: NORMAL
BONE MARROW WRIGHT STAIN COMMENT: NORMAL

## 2018-01-25 PROCEDURE — 99213 OFFICE O/P EST LOW 20 MIN: CPT | Mod: PBBFAC | Performed by: INTERNAL MEDICINE

## 2018-01-25 PROCEDURE — 99214 OFFICE O/P EST MOD 30 MIN: CPT | Mod: S$PBB,,, | Performed by: INTERNAL MEDICINE

## 2018-01-25 PROCEDURE — 99999 PR PBB SHADOW E&M-EST. PATIENT-LVL III: CPT | Mod: PBBFAC,,, | Performed by: INTERNAL MEDICINE

## 2018-01-25 RX ORDER — GLYBURIDE 1.25 MG/1
1.25 TABLET ORAL
COMMUNITY

## 2018-01-25 NOTE — LETTER
January 25, 2018      Aydin Owens III, MD  [address]  [facility] [address]           [facility] Hematology Oncology  [address] [place] [id number]  Phone: [phone]  Fax: [phone]          Patient: Viv Argueta   MR Number: [id number]   YOB: 1950   Date of Visit: 1/25/2018       Dear Dr. Aydin Owens III:    Thank you for referring Viv Argueta to me for evaluation. Attached you will find relevant portions of my assessment and plan of care.    If you have questions, please do not hesitate to call me. I look forward to following Viv Argueta along with you.    Sincerely,    David Abdi MD    Enclosure  CC:  No Recipients    If you would like to receive this communication electronically, please contact [email or web address] or [phone] to request more information on [employer] access.    For providers and/or their staff who would like to refer a patient to [facility], please contact us through our one-stop-shop provider referral line, [facility], at [phone].    If you feel you have received this communication in error or would no longer like to receive these types of communications, please e-mail [email or web address]

## 2018-01-25 NOTE — PROGRESS NOTES
Subjective:       Patient ID: Viv Argueta is a 67 y.o. female.    Chief Complaint: Results and Anemia    HPI  67-year-old female transferred to [facility] for evaluation of pancytopenia.  The patient was recently discharged from the hospital and is here for post discharge evaluation  Past Medical History:   Diagnosis Date    Diabetes mellitus     Hypertension      Family History   Problem Relation Age of Onset    Family history unknown: Yes     Social History     Social History    Marital status:      Spouse name: N/A    Number of children: N/A    Years of education: N/A     Occupational History    Not on file.     Social History Main Topics    Smoking status: Never Smoker    Smokeless tobacco: Never Used    Alcohol use Yes      Comment: occasional    Drug use: No    Sexual activity: No     Other Topics Concern    Not on file     Social History Narrative    No narrative on file     Past Surgical History:   Procedure Laterality Date     SECTION      COLONOSCOPY N/A 2018    Procedure: COLONOSCOPY;  Surgeon: Aydin Owens III, MD;  Location: [facility];  Service: Endoscopy;  Laterality: N/A;       Labs:  Lab Results   Component Value Date    WBC 2.05 (L) 2018    HGB 9.1 (L) 2018    HCT 27.6 (L) 2018     (H) 2018     (L) 2018     BMP  Lab Results   Component Value Date     2018    K 4.0 2018     2018    CO2 22 (L) 2018    BUN 4 (L) 2018    CREATININE 0.8 2018    CALCIUM 9.0 2018    ANIONGAP 9 2018    ESTGFRAFRICA >60 2018    EGFRNONAA >60 2018     Lab Results   Component Value Date    ALT 10 2018    AST 29 2018    ALKPHOS 64 2018    BILITOT 2.0 (H) 2018       Lab Results   Component Value Date    IRON 80 2018    FERRITIN 130 2018     Lab Results   Component Value Date    HMMEIVEP12 <146 (L) 2018      Lab Results   Component Value Date    FOLATE 16.0 01/20/2018     No results found for: TSH      Review of Systems   Constitutional: Positive for activity change and fatigue. Negative for appetite change, chills, diaphoresis, fever and unexpected weight change.   HENT: Negative for congestion, dental problem, drooling, ear discharge, ear pain, facial swelling, hearing loss, mouth sores, nosebleeds, postnasal drip, rhinorrhea, sinus pressure, sneezing, sore throat, tinnitus, trouble swallowing and voice change.    Eyes: Negative for photophobia, pain, discharge, redness, itching and visual disturbance.   Respiratory: Positive for cough. Negative for choking, chest tightness, shortness of breath, wheezing and stridor.    Cardiovascular: Negative for chest pain, palpitations and leg swelling.   Gastrointestinal: Negative for abdominal distention, abdominal pain, anal bleeding, blood in stool, constipation, diarrhea, nausea, rectal pain and vomiting.   Endocrine: Negative for cold intolerance, heat intolerance, polydipsia, polyphagia and polyuria.   Genitourinary: Negative for decreased urine volume, difficulty urinating, dyspareunia, dysuria, enuresis, flank pain, frequency, genital sores, hematuria, menstrual problem, pelvic pain, urgency, vaginal bleeding, vaginal discharge and vaginal pain.   Musculoskeletal: Negative for arthralgias, back pain, gait problem, joint swelling, myalgias, neck pain and neck stiffness.   Skin: Negative for color change, pallor and rash.   Allergic/Immunologic: Negative for environmental allergies, food allergies and immunocompromised state.   Neurological: Positive for weakness. Negative for dizziness, tremors, seizures, syncope, facial asymmetry, speech difficulty, light-headedness, numbness and headaches.   Hematological: Negative for adenopathy. Does not bruise/bleed easily.   Psychiatric/Behavioral: Positive for dysphoric mood. Negative for agitation, behavioral problems,  confusion, decreased concentration, hallucinations, self-injury, sleep disturbance and suicidal ideas. The patient is nervous/anxious. The patient is not hyperactive.        Objective:      Physical Exam   Constitutional: She is oriented to person, place, and time. She has a sickly appearance. She appears ill. She appears distressed.   HENT:   Head: Normocephalic and atraumatic.   Right Ear: External ear normal.   Left Ear: External ear normal.   Nose: Nose normal. Right sinus exhibits no maxillary sinus tenderness and no frontal sinus tenderness. Left sinus exhibits no maxillary sinus tenderness and no frontal sinus tenderness.   Mouth/Throat: Oropharynx is clear and moist. No oropharyngeal exudate.   Eyes: Conjunctivae, EOM and lids are normal. Pupils are equal, round, and reactive to light. Right eye exhibits no discharge. Left eye exhibits no discharge. Right conjunctiva is not injected. Right conjunctiva has no hemorrhage. Left conjunctiva is not injected. Left conjunctiva has no hemorrhage. No scleral icterus.   Neck: Normal range of motion. Neck supple. No JVD present. No tracheal deviation present. No thyromegaly present.   Cardiovascular: Normal rate, regular rhythm, normal heart sounds and intact distal pulses.    Pulmonary/Chest: Effort normal and breath sounds normal. No stridor. No respiratory distress. She exhibits no tenderness.   Abdominal: Soft. Bowel sounds are normal. She exhibits no distension and no mass. There is no splenomegaly or hepatomegaly. There is no tenderness. There is no rebound.   Musculoskeletal: Normal range of motion. She exhibits no edema or tenderness.   Lymphadenopathy:     She has no cervical adenopathy.     She has no axillary adenopathy.        Right: No supraclavicular adenopathy present.        Left: No supraclavicular adenopathy present.   Neurological: She is alert and oriented to person, place, and time. No cranial nerve deficit. Coordination normal.   Skin: Skin is  dry. No rash noted. She is not diaphoretic. No erythema.   Psychiatric: Her behavior is normal. Judgment and thought content normal. Her mood appears anxious. She exhibits a depressed mood.   Vitals reviewed.          Assessment:      1. Pancytopenia    2. Vitamin B12 deficiency anemia due to intrinsic factor deficiency           Plan:   Reviewed results of bone marrow with pathologist with marked erythroid hyperplasia with dyserythropoiesis B12 level undetectable at this point presumptive diagnosis of B12 deficiency will start him thousand micrograms weekly return in one month with repeat CBC

## 2018-01-26 LAB
BODY SITE - BONE MARROW: NORMAL
CLINICAL DIAGNOSIS - BONE MARROW: NORMAL
FLOW CYTOMETRY ANTIBODIES ANALYZED - BONE MARROW: NORMAL
FLOW CYTOMETRY COMMENT - BONE MARROW: NORMAL
FLOW CYTOMETRY INTERPRETATION - BONE MARROW: NORMAL
PHOSPHATIDYLETHANOL (PETH): NEGATIVE NG/ML

## 2018-01-30 NOTE — PHYSICIAN QUERY
PT Name: Viv Argueta  MR #: [id number]     Physician Query Form - Etiology of Condition Clarification      CDS/: Tena King RN             Contact information:[email or web address]  This form is a permanent document in the medical record.     Query Date: January 30, 2018    By submitting this query, we are merely seeking further clarification of documentation.  Please utilize your independent clinical judgment when addressing the question(s) below.     The medical record contains the following:    Findings Supporting Clinical Information Location in Medical Record   +occult blood GI bleed, OCB+, but denies any active bleeding and melena since admit    Genitourinary Comments: +occult blood   GI bleed, GI consult, Transfuse, PPI, NPO    The patient admitted for pancytopenia, acute blood loss anemia from suspected GI bleed. Labs collected on arrival to [facility] consistent with pancytopenia, AST/ALT normal, LD elevated. Rapid HIV neg. Will further eval with anemia labs, Acute Hep panel given LD elevation and pancytopenia.     Per [facility], her markedly elevated LDH (707) suggests either primary liver disease or hemolysis.  Reticulocyte count 5.5, haptoglobin 30 and direct Josy negative.  Pt/INR and PTT WNL.  UDS negative.      FINAL PATHOLOGIC DIAGNOSIS  1  Gastric biopsy. Rule out atrophic gastritis:  Chronic atrophic gastritis, mild; see note.  Immunostaining for H. pylori is pending and will be reported in a supplemental.  Note: A mild gastric atrophy is implied by increased distance between glandular elements in the mucosae . The  overall thickness of the glandular component is not significantly reduced.  2  Corpus nodule biopsy:  Hyperplastic gastric polyp; see note.  Note: A mild increase in foamy macrophages is identified in this biopsy ; PAS staining is pending and will be reported  in a supplemental.  3  Esophageal biopsy:  Squamous esophageal mucosa with changes of acute and chronic  esophagitis;  Negative for eosinophilia;  Negative for intestinal metaplasia or dysplasia;  GMS staining for fungal organisms is pending and will be reported in a supplemental.  [facility]        Progress note 1/22      H & P      Discharge Summary                Discharge Summary            Path report 1/29     Please document your best medical opinion regarding the etiology of __+ occult blood_______________ for which the primary focus of treatment is/was directed.       [ X ]  GI Bleed  [   ]  Other hematological diagnosis (please specify)__________________________                    [    ]  Clinically Undetermined    Please document in your progress notes daily for the duration of treatment, until resolved, and include in your discharge summary.

## 2018-01-31 LAB
CHROM BANDING METHOD: NORMAL
CHROMOSOME ANALYSIS BM ADDITIONAL INFORMATION: NORMAL
CHROMOSOME ANALYSIS BM RELEASED BY: NORMAL
CHROMOSOME ANALYSIS BM RESULT SUMMARY: NORMAL
CLINICAL CYTOGENETICIST REVIEW: NORMAL
KARYOTYP MAR: NORMAL
REASON FOR REFERRAL (NARRATIVE): NORMAL
REF LAB TEST METHOD: NORMAL
SPECIMEN SOURCE: NORMAL
SPECIMEN: NORMAL

## 2018-01-31 NOTE — PHYSICIAN QUERY
PT Name: Viv Argueta  MR #: [id number]    Physician Query Form - Pathology Findings Clarification     CDS/: Tena King RN           Contact information:[email or web address]  This form is a permanent document in the medical record.     Query Date: January 31, 2018      By submitting this query, we are merely seeking further clarification of documentation.  Please utilize your independent clinical judgment when addressing the question(s) below.      The medical record contains the following:     Findings Supporting Clinical Information Location in Medical Record     Chronic atrophic gastritis, mild                  Hyperplastic gastric polyp          Squamous esophageal mucosa with changes of acute and chronic esophagitis   1  Gastric biopsy. Rule out atrophic gastritis:  Chronic atrophic gastritis, mild; see note.  Immunostaining for H. pylori is pending and will be reported in a supplemental.  Note: A mild gastric atrophy is implied by increased distance between glandular elements in the mucosae . The  overall thickness of the glandular component is not significantly reduced.  2  Corpus nodule biopsy:  Hyperplastic gastric polyp; see note.  Note: A mild increase in foamy macrophages is identified in this biopsy ; PAS staining is pending and will be reported in a supplemental.  3  Esophageal biopsy:  Squamous esophageal mucosa with changes of acute and chronic esophagitis;  Negative for eosinophilia;  Negative for intestinal metaplasia or dysplasia;  GMS staining for fungal organisms is pending and will be reported in a supplemental.  [facility]                                      Path report 1/29     ATTENTION :   THIS IS A MULT-QUESTION QUERY, PLEASE SCROLL ALL THE WAY DOWN TO MAKE SURE ALL QUESTIONS CAN BE ADDRESSED, THANK YOU FOR YOUR TIME.    Please document the clinical significance of the Pathologists findings of __Chronic atrophic gastritis, mild______________.          [ X ] I agree with  the Pathology Findings        [  ] I do not agree with the Pathology Findings        [  ] Clinically Insignificant        [  ] Clinically Undetermined        [  ] Other/Clarification of Findings: ______________________________________________    Please document the clinical significance of the Pathologists findings of __Hyperplastic gastric polyp_______.          [ X ] I agree with the Pathology Findings        [  ] I do not agree with the Pathology Findings        [  ] Clinically Insignificant        [  ] Clinically Undetermined        [  ] Other/Clarification of Findings: ______________________________________________    Please document the clinical significance of the Pathologists findings of __Acute and chronic esophagitis_____________________.          [ X ] I agree with the Pathology Findings        [  ] I do not agree with the Pathology Findings        [  ] Clinically Insignificant        [  ] Clinically Undetermined        [  ] Other/Clarification of Findings: ______________________________________________      Can any of these diagnosis be linked to the GI bleed?  If so please specify below:  [ X ]  Yes  (please specify condition)  Gastritis and esophagitis  [   ]  No, none of the conditions on the path report can be linked to the GI bleed  [   ]  Other_______________________________________________________________________    Please document in your progress notes daily for the duration of treatment until resolved and include in your discharge summary.

## 2018-01-31 NOTE — PHYSICIAN QUERY
PT Name: Viv Argueta  MR #: [id number]    Physician Query Form - Cause and Effect Relationship Clarification      CDS/: Tena King RN              Contact information:[email or web address]    This form is a permanent document in the medical record.     Query Date: January 31, 2018   INCORRECT TEMPLATE USED.  RE-ISSUING.  ELISE    By submitting this query, we are merely seeking further clarification of documentation. Please utilize your independent clinical judgment when addressing the question(s) below.    The Medical record contains the following:  Supporting Clinical Findings   Location in record    .1  Gastric biopsy. Rule out atrophic gastritis:  Chronic atrophic gastritis, mild; see note.  Immunostaining for H. pylori is pending and will be reported in a supplemental.  Note: A mild gastric atrophy is implied by increased distance between glandular elements in the mucosae . The  overall thickness of the glandular component is not significantly reduced.  2  Corpus nodule biopsy:  Hyperplastic gastric polyp; see note.  Note: A mild increase in foamy macrophages is identified in this biopsy ; PAS staining is pending and will be reported in a supplemental.  3  Esophageal biopsy:  Squamous esophageal mucosa with changes of acute and chronic esophagitis;  Negative for eosinophilia;  Negative for intestinal metaplasia or dysplasia;  GMS staining for fungal organisms is pending and will be reported in a supplemental.  [facility]                                                                                                                                                                                       Path report 1/29         Provider, please clarify if there is any correlation between _Chronic atrophic gastritis___ and ____GI Bleed_____.           Are the conditions:     [  ] Due to or associated with each other     [  ] Unrelated to each other     [  ] Other correlation/etiology to GI bleed (Please  Specify): _________________________     [  ] Clinically Undetermined

## 2018-03-01 ENCOUNTER — OFFICE VISIT (OUTPATIENT)
Dept: GASTROENTEROLOGY | Facility: CLINIC | Age: 68
End: 2018-03-01
Payer: MEDICARE

## 2018-03-01 ENCOUNTER — LAB VISIT (OUTPATIENT)
Dept: LAB | Facility: HOSPITAL | Age: 68
End: 2018-03-01
Attending: INTERNAL MEDICINE
Payer: MEDICARE

## 2018-03-01 VITALS
WEIGHT: 154.13 LBS | SYSTOLIC BLOOD PRESSURE: 138 MMHG | DIASTOLIC BLOOD PRESSURE: 84 MMHG | BODY MASS INDEX: 29.1 KG/M2 | HEIGHT: 61 IN | HEART RATE: 88 BPM

## 2018-03-01 DIAGNOSIS — E53.8 VITAMIN B 12 DEFICIENCY: ICD-10-CM

## 2018-03-01 DIAGNOSIS — D61.818 PANCYTOPENIA: ICD-10-CM

## 2018-03-01 DIAGNOSIS — D64.9 SYMPTOMATIC ANEMIA: Primary | ICD-10-CM

## 2018-03-01 LAB
BASOPHILS # BLD AUTO: 0 K/UL
BASOPHILS NFR BLD: 0 %
DIFFERENTIAL METHOD: ABNORMAL
EOSINOPHIL # BLD AUTO: 0 K/UL
EOSINOPHIL NFR BLD: 0 %
ERYTHROCYTE [DISTWIDTH] IN BLOOD BY AUTOMATED COUNT: 15.3 %
HCT VFR BLD AUTO: 29.5 %
HGB BLD-MCNC: 10.1 G/DL
LDH SERPL L TO P-CCNC: 177 U/L
LYMPHOCYTES # BLD AUTO: 1.3 K/UL
LYMPHOCYTES NFR BLD: 34 %
MCH RBC QN AUTO: 32.3 PG
MCHC RBC AUTO-ENTMCNC: 34.2 G/DL
MCV RBC AUTO: 94 FL
MONOCYTES # BLD AUTO: 0.3 K/UL
MONOCYTES NFR BLD: 6.9 %
NEUTROPHILS # BLD AUTO: 2.3 K/UL
NEUTROPHILS NFR BLD: 59.1 %
PLATELET # BLD AUTO: 209 K/UL
PMV BLD AUTO: 10.5 FL
RBC # BLD AUTO: 3.13 M/UL
WBC # BLD AUTO: 3.94 K/UL

## 2018-03-01 PROCEDURE — 83615 LACTATE (LD) (LDH) ENZYME: CPT

## 2018-03-01 PROCEDURE — 99213 OFFICE O/P EST LOW 20 MIN: CPT | Mod: PBBFAC | Performed by: INTERNAL MEDICINE

## 2018-03-01 PROCEDURE — 85025 COMPLETE CBC W/AUTO DIFF WBC: CPT

## 2018-03-01 PROCEDURE — 99212 OFFICE O/P EST SF 10 MIN: CPT | Mod: S$PBB,,, | Performed by: INTERNAL MEDICINE

## 2018-03-01 PROCEDURE — 99999 PR PBB SHADOW E&M-EST. PATIENT-LVL III: CPT | Mod: PBBFAC,,, | Performed by: INTERNAL MEDICINE

## 2018-03-01 NOTE — PROGRESS NOTES
Subjective:       Patient ID: Viv Argueta is a 67 y.o. female.    Chief Complaint: Hospital Follow Up    The patient was seen in the hospital January 20th for symptomatic anemia. We were consulted because she had heme positive stool. EGD and colonoscopy did not show significant findings to explain her presentation with Pancytopenia, macrocytic indices and Hgb 7.8 g. He was found to be vitamin B 12 deficient compatible with pernicious anemia. Replacement is in process by Hematology. Hgb is up to 10.1 g now. Patient is feeling much better. There are no significant complaints.      Review of Systems   Constitutional: Positive for unexpected weight change. Negative for activity change, appetite change, chills, diaphoresis, fatigue and fever.   HENT: Negative for congestion, ear discharge, ear pain, hearing loss, nosebleeds, postnasal drip and tinnitus.    Eyes: Negative for photophobia and visual disturbance.   Respiratory: Negative for apnea, cough, choking, chest tightness, shortness of breath and wheezing.    Cardiovascular: Negative for chest pain, palpitations and leg swelling.   Gastrointestinal: Negative for abdominal distention, abdominal pain, anal bleeding, blood in stool, constipation, diarrhea, nausea, rectal pain and vomiting.   Genitourinary: Negative for difficulty urinating, dyspareunia, dysuria, flank pain, frequency, hematuria, menstrual problem, pelvic pain, urgency, vaginal bleeding and vaginal discharge.   Musculoskeletal: Negative for arthralgias, back pain, gait problem, joint swelling, myalgias and neck stiffness.   Skin: Negative for pallor and rash.   Neurological: Positive for headaches. Negative for dizziness, tremors, seizures, syncope, speech difficulty, weakness and numbness.   Hematological: Negative for adenopathy.   Psychiatric/Behavioral: Negative for agitation, confusion, hallucinations, sleep disturbance and suicidal ideas.       Objective:      Physical Exam    Assessment:      Pernicious Anemia  No diagnosis found.    Plan:     No further GI evaluations at present.

## 2024-07-26 NOTE — PLAN OF CARE
Last seen on 5/1, no upcoming visit   Problem: Patient Care Overview  Goal: Plan of Care Review  Outcome: Ongoing (interventions implemented as appropriate)  Pt remained free of injury during shift, stable condition, denied pain, no acute distress, receiving IV fluids, tolerating clear liquid diet, Pt denied having blood in stools, blood glucose monitoring performed, on cardiac monitoring (SR-ST, HR 80-100s), and will continue to monitor. 24hr chart review performed.